# Patient Record
Sex: FEMALE | Race: WHITE | Employment: UNEMPLOYED | URBAN - METROPOLITAN AREA
[De-identification: names, ages, dates, MRNs, and addresses within clinical notes are randomized per-mention and may not be internally consistent; named-entity substitution may affect disease eponyms.]

---

## 2020-07-17 ENCOUNTER — OFFICE VISIT (OUTPATIENT)
Dept: ORTHOPEDIC SURGERY | Age: 60
End: 2020-07-17
Payer: COMMERCIAL

## 2020-07-17 ENCOUNTER — TELEPHONE (OUTPATIENT)
Dept: ORTHOPEDIC SURGERY | Age: 60
End: 2020-07-17

## 2020-07-17 VITALS — HEIGHT: 67 IN | BODY MASS INDEX: 25.58 KG/M2 | WEIGHT: 163 LBS | TEMPERATURE: 98.5 F

## 2020-07-17 PROCEDURE — 99203 OFFICE O/P NEW LOW 30 MIN: CPT | Performed by: ORTHOPAEDIC SURGERY

## 2020-07-17 NOTE — TELEPHONE ENCOUNTER
07/17/2020  RAEKSH   (QTY 1)   LEFT  KNEE. DENIED  NO COVERAGE - CASH BASED PROGRAM. PER LEADERSHIP.  NOT A COVERED BENEFIT FOR THIS SELF FUNDED BCBS OF [facility], PER NOTES FOR THIS GROUP.  AP

## 2020-07-17 NOTE — PROGRESS NOTES
Antalgic      Additional Comments:       Additional Examinations:         Right Lower Extremity: Examination of the right lower extremity does not show any tenderness, deformity or injury. Range of motion is unremarkable. There is no gross instability. There are no rashes, ulcerations or lesions. Strength and tone are normal.    Radiology:     X-rays obtained and reviewed in office:  Views 4 views left knee does demonstrate significant degenerative change primarily within the medial compartment with significant medial joint space loss and osteophyte formation. No evidence of fracture dislocation    Assessment : Left knee osteoarthritis    Impression:  Encounter Diagnoses   Name Primary?  Left knee pain, unspecified chronicity Yes    Primary osteoarthritis of left knee        Office Procedures:  Orders Placed This Encounter   Procedures    XR KNEE LEFT (MIN 4 VIEWS)     Standing Status:   Future     Number of Occurrences:   1     Standing Expiration Date:   7/17/2021       Treatment Plan: I discussed the diagnosis and treatment options with her today. She does have significant medial compartmental osteoarthritis. She did have a cortisone injection into this knee about a month ago they gave her a little bit of relief for couple days. I would recommend at this time getting her approved for a series of Visco injections. She is agreeable with this plan. In addition she does have naproxen that she got through the emergency department that she will continue to use. Also recommend ice and anti-inflammatories. Avoidance of impact activities. I will see her back in clinic once we do get approval for the Visco injection.   We also did briefly discuss total knee arthroplasty of which she will be a candidate for in the future

## 2020-07-20 ENCOUNTER — TELEPHONE (OUTPATIENT)
Dept: ORTHOPEDIC SURGERY | Age: 60
End: 2020-07-20

## 2020-07-20 NOTE — TELEPHONE ENCOUNTER
GERRI for patient, Durolane left knee was denied by insurance, it is not a covered benefit with her plan. She will call the office if she decides to use [facility]'s cash based program.  Boone Hutton Ear

## 2020-07-28 ENCOUNTER — TELEPHONE (OUTPATIENT)
Dept: ORTHOPEDIC SURGERY | Age: 60
End: 2020-07-28

## 2020-07-28 NOTE — TELEPHONE ENCOUNTER
Returned patient call, LM that patient could either have Visco injections under self pay policy or schedule a follow up appointment for her left knee with Dr. Carol Pedro to discuss the next step. Keiko Hong

## 2020-08-18 ENCOUNTER — OFFICE VISIT (OUTPATIENT)
Dept: ORTHOPEDIC SURGERY | Age: 60
End: 2020-08-18
Payer: COMMERCIAL

## 2020-08-18 VITALS — WEIGHT: 163 LBS | BODY MASS INDEX: 25.58 KG/M2 | HEIGHT: 67 IN | RESPIRATION RATE: 17 BRPM

## 2020-08-18 PROCEDURE — 99213 OFFICE O/P EST LOW 20 MIN: CPT | Performed by: ORTHOPAEDIC SURGERY

## 2020-08-18 NOTE — PROGRESS NOTES
Chief Complaint    Follow-up (left knee)      History of Present Illness:  Rolo Sapp is a 64 y.o. female. She is here today for follow-up for her left knee. She does continue to have significant pain within her left knee. Pain is been getting progressively worse. She does feel more limited with activity now because of the pain within the knee. We did try and get her approved for Visco injections but she was denied. Medical History:  Patient's medications, allergies, past medical, surgical, social and family histories were reviewed and updated as appropriate. Review of Systems:  Pertinent items are noted in HPI  Review of systems reviewed from Patient History Form dated on 8/18/20 and available in the patient's chart under the Media tab. Vital Signs:  Resp 17   Ht 5' 6.5\" (1.689 m)   Wt 163 lb (73.9 kg)   BMI 25.91 kg/m²     General Exam:   Constitutional: Patient is adequately groomed with no evidence of malnutrition  DTRs: Deep tendon reflexes are intact  Mental Status: The patient is oriented to time, place and person. The patient's mood and affect are appropriate. Knee Examination:    Inspection: There is some visible swelling note about the left knee. She does have an overall varus alignment     Palpation: She does have a lot of tenderness palpation primarily along the medial joint line. There is some mild lateral joint line tenderness. Mild palpable effusion     Range of Motion: Extension of the knee 0 degrees with knee flexion today to 125 degrees     Strength: She is able to do a straight leg raise     Special Tests: She does have some pseudo-valgus laxity. Negative posterior drawer. Negative Apley David     Skin: There are no rashes, ulcerations or lesions.     Gait: Antalgic       Additional Comments:       Additional Examinations:         Right Lower Extremity: Examination of the right lower extremity does not show any tenderness, deformity or injury.   Range of

## 2020-08-27 ENCOUNTER — TELEPHONE (OUTPATIENT)
Dept: ORTHOPEDIC SURGERY | Age: 60
End: 2020-08-27

## 2020-09-22 RX ORDER — LOSARTAN POTASSIUM 100 MG/1
TABLET ORAL
COMMUNITY
Start: 2020-07-23

## 2020-09-22 RX ORDER — FUROSEMIDE 20 MG/1
20 TABLET ORAL
COMMUNITY
Start: 2020-09-16

## 2020-09-22 NOTE — PROGRESS NOTES
Name_______________________________________Printed:____________________  Date and time of uwajmby_[id number]_______________________[place] Time:__0530 [facility] [place]   1. The instructions given regarding when and if a patient needs to stop oral intake prior to surgery varies. Follow the specific instructions you were given                  _X__Nothing to eat or to drink after Midnight the night before.                             ____Endoscopy patient follow your DRS instructions-generally you will be doing a part of the prep after Midnight                   ___X_Carbo loading or ERAS instructions will be given to select patients-if you have been given those instructions -please do the following                           The evening before your surgery after dinner before midnight drink 40 ounces of gatorade. If you are diabetic use sugar free. The morning of surgery drink 40 ounces of water. This needs to be finished 3 hours prior to your surgery start time. 2. Take the following pills with a small sip of water on the morning of surgery__LEVOTHYROXINE, OMEPRAZOLE, METOPROLOL, FELODIPINE _________________________________________________                  Do not take blood pressure medications ending in pril or sartan the brad prior to surgery or the morning of surgery_   3. Aspirin, Ibuprofen, Advil, Naproxen, Vitamin E and other Anti-inflammatory products and supplements should be stopped for  -7days before surgery or as directed by your physician. 4. Check with your Doctor regarding stopping Plavix, Coumadin,Eliquis, Lovenox,Effient,Pradaxa,Xarelto, Fragmin or other blood thinners and follow their instructions. 5. Do not smoke, and do not drink any alcoholic beverages 24 hours prior to surgery. This includes NA Beer. Refrain from the usage of any recreational drugs. 6. You may brush your teeth and gargle the morning of surgery. DO NOT SWALLOW WATER   7.  You MUST make arrangements for a responsible adult to stay on site while you are here and take you home after your surgery. You will not be allowed to leave alone or drive yourself home. It is strongly suggested someone stay with you the first 24 hrs. Your surgery will be cancelled if you do not have a ride home. 8. A parent/legal guardian must accompany a child scheduled for surgery and plan to stay at the hospital until the child is discharged. Please do not bring other children with you. 9. Please wear simple, loose fitting clothing to the hospital.  Teodoro Stearns not bring valuables (money, credit cards, checkbooks, etc.) Do not wear any makeup (including no eye makeup) or nail polish on your fingers or toes. 10. DO NOT wear any jewelry or piercings on day of surgery. All body piercing jewelry must be removed. 11. If you have ___dentures, they will be removed before going to the OR; we will provide you a container. If you wear ___contact lenses or ___glasses, they will be removed; please bring a case for them. 12. Please see your family doctor/pediatrician for a history & physical and/or concerning medications. Bring any test results/reports from your physician's office. PCP__________________Phone___________H&P Appt. Date________             13 If you  have a Living Will and Durable Power of  for Healthcare, please bring in a copy. 15. Notify your Surgeon if you develop any illness between now and surgery  time, cough, cold, fever, sore throat, nausea, vomiting, etc.  Please notify your surgeon if you experience dizziness, shortness of breath or blurred vision between now & the time of your surgery             15. DO NOT shave your operative site 96 hours prior to surgery. For face & neck surgery, men may use an electric razor 48 hours prior to surgery. 16. Shower the night before or morning of surgery using an antibacterial soap or as you have been instructed.              17. To provide

## 2020-09-23 RX ORDER — FELODIPINE 2.5 MG/1
2.5 TABLET, EXTENDED RELEASE ORAL DAILY
COMMUNITY

## 2020-09-24 ENCOUNTER — TELEPHONE (OUTPATIENT)
Dept: ORTHOPEDIC SURGERY | Age: 60
End: 2020-09-24

## 2020-09-25 ENCOUNTER — OFFICE VISIT (OUTPATIENT)
Dept: PRIMARY CARE CLINIC | Age: 60
End: 2020-09-25
Payer: COMMERCIAL

## 2020-09-25 ENCOUNTER — HOSPITAL ENCOUNTER (OUTPATIENT)
Age: 60
Discharge: HOME | End: 2020-09-25
Payer: COMMERCIAL

## 2020-09-25 LAB
ABO/RH: NORMAL
ANION GAP SERPL CALCULATED.3IONS-SCNC: 15 MMOL/L (ref 3–16)
ANTIBODY SCREEN: NORMAL
APTT: 28.1 SEC (ref 24.2–36.2)
BASOPHILS ABSOLUTE: 0.1 K/UL (ref 0–0.2)
BASOPHILS RELATIVE PERCENT: 0.8 %
BILIRUBIN URINE: NEGATIVE
BLOOD, URINE: NEGATIVE
BUN BLDV-MCNC: 5 MG/DL (ref 7–20)
CALCIUM SERPL-MCNC: 9.4 MG/DL (ref 8.3–10.6)
CHLORIDE BLD-SCNC: 92 MMOL/L (ref 99–110)
CLARITY: ABNORMAL
CO2: 24 MMOL/L (ref 21–32)
COLOR: YELLOW
CREAT SERPL-MCNC: <0.5 MG/DL (ref 0.6–1.1)
EOSINOPHILS ABSOLUTE: 0.1 K/UL (ref 0–0.6)
EOSINOPHILS RELATIVE PERCENT: 1.7 %
EPITHELIAL CELLS, UA: NORMAL /HPF (ref 0–5)
GFR AFRICAN AMERICAN: >60
GFR NON-AFRICAN AMERICAN: >60
GLUCOSE BLD-MCNC: 104 MG/DL (ref 70–99)
GLUCOSE URINE: NEGATIVE MG/DL
HCT VFR BLD CALC: 41.2 % (ref 36–48)
HEMOGLOBIN: 14.1 G/DL (ref 12–16)
INR BLD: 0.92 (ref 0.86–1.14)
KETONES, URINE: NEGATIVE MG/DL
LEUKOCYTE ESTERASE, URINE: ABNORMAL
LYMPHOCYTES ABSOLUTE: 1.8 K/UL (ref 1–5.1)
LYMPHOCYTES RELATIVE PERCENT: 21.2 %
MCH RBC QN AUTO: 34.9 PG (ref 26–34)
MCHC RBC AUTO-ENTMCNC: 34.1 G/DL (ref 31–36)
MCV RBC AUTO: 102.2 FL (ref 80–100)
MICROSCOPIC EXAMINATION: YES
MONOCYTES ABSOLUTE: 0.5 K/UL (ref 0–1.3)
MONOCYTES RELATIVE PERCENT: 6.3 %
NEUTROPHILS ABSOLUTE: 6.1 K/UL (ref 1.7–7.7)
NEUTROPHILS RELATIVE PERCENT: 70 %
NITRITE, URINE: NEGATIVE
PDW BLD-RTO: 12.6 % (ref 12.4–15.4)
PH UA: 7 (ref 5–8)
PLATELET # BLD: 312 K/UL (ref 135–450)
PMV BLD AUTO: 7.8 FL (ref 5–10.5)
POTASSIUM SERPL-SCNC: 4.9 MMOL/L (ref 3.5–5.1)
PROTEIN UA: NEGATIVE MG/DL
PROTHROMBIN TIME: 10.7 SEC (ref 10–13.2)
RBC # BLD: 4.04 M/UL (ref 4–5.2)
RBC UA: NORMAL /HPF (ref 0–4)
SODIUM BLD-SCNC: 131 MMOL/L (ref 136–145)
SPECIFIC GRAVITY UA: 1 (ref 1–1.03)
URINE TYPE: ABNORMAL
UROBILINOGEN, URINE: 0.2 E.U./DL
WBC # BLD: 8.7 K/UL (ref 4–11)
WBC UA: NORMAL /HPF (ref 0–5)

## 2020-09-25 PROCEDURE — 36415 COLL VENOUS BLD VENIPUNCTURE: CPT

## 2020-09-25 PROCEDURE — 99211 OFF/OP EST MAY X REQ PHY/QHP: CPT | Performed by: NURSE PRACTITIONER

## 2020-09-25 PROCEDURE — 86901 BLOOD TYPING SEROLOGIC RH(D): CPT

## 2020-09-25 PROCEDURE — 86850 RBC ANTIBODY SCREEN: CPT

## 2020-09-25 PROCEDURE — 85610 PROTHROMBIN TIME: CPT

## 2020-09-25 PROCEDURE — 87086 URINE CULTURE/COLONY COUNT: CPT

## 2020-09-25 PROCEDURE — 81001 URINALYSIS AUTO W/SCOPE: CPT

## 2020-09-25 PROCEDURE — 85025 COMPLETE CBC W/AUTO DIFF WBC: CPT

## 2020-09-25 PROCEDURE — 86900 BLOOD TYPING SEROLOGIC ABO: CPT

## 2020-09-25 PROCEDURE — 85730 THROMBOPLASTIN TIME PARTIAL: CPT

## 2020-09-25 PROCEDURE — 80048 BASIC METABOLIC PNL TOTAL CA: CPT

## 2020-09-25 PROCEDURE — 87081 CULTURE SCREEN ONLY: CPT

## 2020-09-25 NOTE — PATIENT INSTRUCTIONS
Advance Care Planning  People with COVID-19 may have no symptoms, mild symptoms, such as fever, cough, and shortness of breath or they may have more severe illness, developing severe and fatal pneumonia. As a result, Advance Care Planning with attention to naming a health care decision maker (someone you trust to make healthcare decisions for you if you could not speak for yourself) and sharing other health care preferences is important BEFORE a possible health crisis. Please contact your Primary Care Provider to discuss Advance Care Planning. Preventing the Spread of Coronavirus Disease 2019 in Homes and Residential Communities  For the most recent information go to [email or web address]    Prevention steps for People with confirmed or suspected COVID-19 (including persons under investigation) who do not need to be hospitalized  and   People with confirmed COVID-19 who were hospitalized and determined to be medically stable to go home    Your healthcare provider and public health staff will evaluate whether you can be cared for at home. If it is determined that you do not need to be hospitalized and can be isolated at home, you will be monitored by staff from your local or state health department. You should follow the prevention steps below until a healthcare provider or local or state health department says you can return to your normal activities. Stay home except to get medical care  People who are mildly ill with COVID-19 are able to isolate at home during their illness. You should restrict activities outside your home, except for getting medical care. Do not go to work, school, or public areas. Avoid using public transportation, ride-sharing, or taxis. Separate yourself from other people and animals in your home  People: As much as possible, you should stay in a specific room and away from other people in your home.  Also, you should use a separate bathroom, if available. Animals: You should restrict contact with pets and other animals while you are sick with COVID-19, just like you would around other people. Although there have not been reports of pets or other animals becoming sick with COVID-19, it is still recommended that people sick with COVID-19 limit contact with animals until more information is known about the virus. When possible, have another member of your household care for your animals while you are sick. If you are sick with COVID-19, avoid contact with your pet, including petting, snuggling, being kissed or licked, and sharing food. If you must care for your pet or be around animals while you are sick, wash your hands before and after you interact with pets and wear a facemask. Call ahead before visiting your doctor  If you have a medical appointment, call the healthcare provider and tell them that you have or may have COVID-19. This will help the healthcare providers office take steps to keep other people from getting infected or exposed. Wear a facemask  You should wear a facemask when you are around other people (e.g., sharing a room or vehicle) or pets and before you enter a healthcare providers office. If you are not able to wear a facemask (for example, because it causes trouble breathing), then people who live with you should not stay in the same room with you, or they should wear a facemask if they enter your room. Cover your coughs and sneezes  Cover your mouth and nose with a tissue when you cough or sneeze. Throw used tissues in a lined trash can. Immediately wash your hands with soap and water for at least 20 seconds or, if soap and water are not available, clean your hands with an alcohol-based hand  that contains at least 60% alcohol.   Clean your hands often  Wash your hands often with soap and water for at least 20 seconds, especially after blowing your nose, coughing, or sneezing; going to the bathroom; and have a medical emergency and need to call 911, notify the dispatch personnel that you have, or are being evaluated for COVID-19. If possible, put on a facemask before emergency medical services arrive. Discontinuing home isolation  Patients with confirmed COVID-19 should remain under home isolation precautions until the risk of secondary transmission to others is thought to be low. The decision to discontinue home isolation precautions should be made on a case-by-case basis, in consultation with healthcare providers and state and local health departments.

## 2020-09-25 NOTE — PROGRESS NOTES
Megan Monreal received a viral test for COVID-19. They were educated on isolation and quarantine as appropriate. For any symptoms, they were directed to seek care from their PCP, given contact information to establish with a doctor, directed to an urgent care or the emergency room.

## 2020-09-26 LAB
SARS-COV-2, NAA: NOT DETECTED
URINE CULTURE, ROUTINE: NORMAL

## 2020-09-27 LAB — MRSA CULTURE ONLY: NORMAL

## 2020-10-01 ENCOUNTER — APPOINTMENT (OUTPATIENT)
Dept: GENERAL RADIOLOGY | Age: 60
End: 2020-10-01
Attending: ORTHOPAEDIC SURGERY
Payer: COMMERCIAL

## 2020-10-01 ENCOUNTER — ANESTHESIA EVENT (OUTPATIENT)
Dept: OPERATING ROOM | Age: 60
End: 2020-10-01
Payer: COMMERCIAL

## 2020-10-01 ENCOUNTER — HOSPITAL ENCOUNTER (OUTPATIENT)
Age: 60
Setting detail: OBSERVATION
Discharge: HOME HEALTH | End: 2020-10-02
Attending: ORTHOPAEDIC SURGERY | Admitting: ORTHOPAEDIC SURGERY
Payer: COMMERCIAL

## 2020-10-01 ENCOUNTER — ANESTHESIA (OUTPATIENT)
Dept: OPERATING ROOM | Age: 60
End: 2020-10-01
Payer: COMMERCIAL

## 2020-10-01 VITALS
TEMPERATURE: 97.2 F | RESPIRATION RATE: 6 BRPM | DIASTOLIC BLOOD PRESSURE: 101 MMHG | OXYGEN SATURATION: 100 % | SYSTOLIC BLOOD PRESSURE: 173 MMHG

## 2020-10-01 PROBLEM — K21.9 GERD (GASTROESOPHAGEAL REFLUX DISEASE): Status: ACTIVE | Noted: 2020-10-01

## 2020-10-01 PROBLEM — I10 HTN (HYPERTENSION): Status: ACTIVE | Noted: 2020-10-01

## 2020-10-01 PROBLEM — M17.12 PRIMARY OSTEOARTHRITIS OF LEFT KNEE: Status: ACTIVE | Noted: 2020-10-01

## 2020-10-01 PROBLEM — E03.9 HYPOTHYROID: Status: ACTIVE | Noted: 2020-10-01

## 2020-10-01 LAB
ABO/RH: NORMAL
ANTIBODY SCREEN: NORMAL
GLUCOSE BLD-MCNC: 163 MG/DL (ref 70–99)
GLUCOSE BLD-MCNC: 169 MG/DL (ref 70–99)
GLUCOSE BLD-MCNC: 233 MG/DL (ref 70–99)
HCT VFR BLD CALC: 38.4 % (ref 36–48)
HEMOGLOBIN: 13.4 G/DL (ref 12–16)
PERFORMED ON: ABNORMAL

## 2020-10-01 PROCEDURE — 7100000001 HC PACU RECOVERY - ADDTL 15 MIN: Performed by: ORTHOPAEDIC SURGERY

## 2020-10-01 PROCEDURE — APPNB45 APP NON BILLABLE 31-45 MINUTES: Performed by: NURSE PRACTITIONER

## 2020-10-01 PROCEDURE — 94150 VITAL CAPACITY TEST: CPT

## 2020-10-01 PROCEDURE — C1776 JOINT DEVICE (IMPLANTABLE): HCPCS | Performed by: ORTHOPAEDIC SURGERY

## 2020-10-01 PROCEDURE — 94760 N-INVAS EAR/PLS OXIMETRY 1: CPT

## 2020-10-01 PROCEDURE — 7100000000 HC PACU RECOVERY - FIRST 15 MIN: Performed by: ORTHOPAEDIC SURGERY

## 2020-10-01 PROCEDURE — 6360000002 HC RX W HCPCS: Performed by: ORTHOPAEDIC SURGERY

## 2020-10-01 PROCEDURE — 3600000015 HC SURGERY LEVEL 5 ADDTL 15MIN: Performed by: ORTHOPAEDIC SURGERY

## 2020-10-01 PROCEDURE — 3600000005 HC SURGERY LEVEL 5 BASE: Performed by: ORTHOPAEDIC SURGERY

## 2020-10-01 PROCEDURE — 3700000000 HC ANESTHESIA ATTENDED CARE: Performed by: ORTHOPAEDIC SURGERY

## 2020-10-01 PROCEDURE — 64447 NJX AA&/STRD FEMORAL NRV IMG: CPT | Performed by: FAMILY MEDICINE

## 2020-10-01 PROCEDURE — 2709999900 HC NON-CHARGEABLE SUPPLY: Performed by: ORTHOPAEDIC SURGERY

## 2020-10-01 PROCEDURE — 2580000003 HC RX 258: Performed by: ORTHOPAEDIC SURGERY

## 2020-10-01 PROCEDURE — 97116 GAIT TRAINING THERAPY: CPT

## 2020-10-01 PROCEDURE — 2720000010 HC SURG SUPPLY STERILE: Performed by: ORTHOPAEDIC SURGERY

## 2020-10-01 PROCEDURE — 99024 POSTOP FOLLOW-UP VISIT: CPT | Performed by: NURSE PRACTITIONER

## 2020-10-01 PROCEDURE — 97165 OT EVAL LOW COMPLEX 30 MIN: CPT

## 2020-10-01 PROCEDURE — 73560 X-RAY EXAM OF KNEE 1 OR 2: CPT

## 2020-10-01 PROCEDURE — 97530 THERAPEUTIC ACTIVITIES: CPT

## 2020-10-01 PROCEDURE — 3700000001 HC ADD 15 MINUTES (ANESTHESIA): Performed by: ORTHOPAEDIC SURGERY

## 2020-10-01 PROCEDURE — G0378 HOSPITAL OBSERVATION PER HR: HCPCS

## 2020-10-01 PROCEDURE — 51798 US URINE CAPACITY MEASURE: CPT

## 2020-10-01 PROCEDURE — 64445 NJX AA&/STRD SCIATIC NRV IMG: CPT | Performed by: FAMILY MEDICINE

## 2020-10-01 PROCEDURE — C1713 ANCHOR/SCREW BN/BN,TIS/BN: HCPCS | Performed by: ORTHOPAEDIC SURGERY

## 2020-10-01 PROCEDURE — 2500000003 HC RX 250 WO HCPCS: Performed by: NURSE ANESTHETIST, CERTIFIED REGISTERED

## 2020-10-01 PROCEDURE — 2500000003 HC RX 250 WO HCPCS: Performed by: ORTHOPAEDIC SURGERY

## 2020-10-01 PROCEDURE — 1200000000 HC SEMI PRIVATE

## 2020-10-01 PROCEDURE — 85018 HEMOGLOBIN: CPT

## 2020-10-01 PROCEDURE — 2580000003 HC RX 258: Performed by: NURSE ANESTHETIST, CERTIFIED REGISTERED

## 2020-10-01 PROCEDURE — 97161 PT EVAL LOW COMPLEX 20 MIN: CPT

## 2020-10-01 PROCEDURE — 85014 HEMATOCRIT: CPT

## 2020-10-01 PROCEDURE — 86850 RBC ANTIBODY SCREEN: CPT

## 2020-10-01 PROCEDURE — 6360000002 HC RX W HCPCS: Performed by: NURSE ANESTHETIST, CERTIFIED REGISTERED

## 2020-10-01 PROCEDURE — 6370000000 HC RX 637 (ALT 250 FOR IP): Performed by: ORTHOPAEDIC SURGERY

## 2020-10-01 PROCEDURE — 86900 BLOOD TYPING SEROLOGIC ABO: CPT

## 2020-10-01 PROCEDURE — 86901 BLOOD TYPING SEROLOGIC RH(D): CPT

## 2020-10-01 DEVICE — JOURNEY TIBIAL BASEPLATE NONPOROUS                                    LEFT SIZE 4
Type: IMPLANTABLE DEVICE | Site: KNEE | Status: FUNCTIONAL
Brand: JOURNEY

## 2020-10-01 DEVICE — JOURNEY II BCS FEMORAL OXINIUM                                    LEFT SIZE 5
Type: IMPLANTABLE DEVICE | Site: KNEE | Status: FUNCTIONAL
Brand: JOURNEY

## 2020-10-01 DEVICE — JOURNEY II BCS XLPE ARTICULAR                                    INSERT SIZE 3-4 LEFT 12MM
Type: IMPLANTABLE DEVICE | Site: KNEE | Status: FUNCTIONAL
Brand: JOURNEY

## 2020-10-01 DEVICE — CEMENT BNE 40GM FULL DOSE PMMA W/ GENT HI VISC RADPQ LNG: Type: IMPLANTABLE DEVICE | Site: KNEE | Status: FUNCTIONAL

## 2020-10-01 DEVICE — GENESIS II OVAL RESURFACING                                    PATELLAR 32MM
Type: IMPLANTABLE DEVICE | Site: PATELLA | Status: FUNCTIONAL
Brand: GENESIS II

## 2020-10-01 RX ORDER — HYDRALAZINE HYDROCHLORIDE 20 MG/ML
10 INJECTION INTRAMUSCULAR; INTRAVENOUS EVERY 6 HOURS PRN
Status: DISCONTINUED | OUTPATIENT
Start: 2020-10-01 | End: 2020-10-01 | Stop reason: RX

## 2020-10-01 RX ORDER — ASCORBIC ACID 500 MG
500 TABLET ORAL DAILY
Status: DISCONTINUED | OUTPATIENT
Start: 2020-10-01 | End: 2020-10-02 | Stop reason: HOSPADM

## 2020-10-01 RX ORDER — LOSARTAN POTASSIUM 100 MG/1
100 TABLET ORAL DAILY
Status: DISCONTINUED | OUTPATIENT
Start: 2020-10-01 | End: 2020-10-01

## 2020-10-01 RX ORDER — SODIUM CHLORIDE 0.9 % (FLUSH) 0.9 %
10 SYRINGE (ML) INJECTION PRN
Status: DISCONTINUED | OUTPATIENT
Start: 2020-10-01 | End: 2020-10-02 | Stop reason: HOSPADM

## 2020-10-01 RX ORDER — NEOSTIGMINE METHYLSULFATE 5 MG/5 ML
SYRINGE (ML) INTRAVENOUS PRN
Status: DISCONTINUED | OUTPATIENT
Start: 2020-10-01 | End: 2020-10-01 | Stop reason: SDUPTHER

## 2020-10-01 RX ORDER — HYDROMORPHONE HCL 110MG/55ML
0.25 PATIENT CONTROLLED ANALGESIA SYRINGE INTRAVENOUS EVERY 5 MIN PRN
Status: DISCONTINUED | OUTPATIENT
Start: 2020-10-01 | End: 2020-10-01

## 2020-10-01 RX ORDER — SODIUM CHLORIDE, SODIUM LACTATE, POTASSIUM CHLORIDE, CALCIUM CHLORIDE 600; 310; 30; 20 MG/100ML; MG/100ML; MG/100ML; MG/100ML
INJECTION, SOLUTION INTRAVENOUS CONTINUOUS
Status: DISCONTINUED | OUTPATIENT
Start: 2020-10-01 | End: 2020-10-02 | Stop reason: HOSPADM

## 2020-10-01 RX ORDER — HYDROMORPHONE HYDROCHLORIDE 1 MG/ML
0.5 INJECTION, SOLUTION INTRAMUSCULAR; INTRAVENOUS; SUBCUTANEOUS
Status: DISCONTINUED | OUTPATIENT
Start: 2020-10-01 | End: 2020-10-02 | Stop reason: HOSPADM

## 2020-10-01 RX ORDER — PROMETHAZINE HYDROCHLORIDE 25 MG/ML
6.25 INJECTION, SOLUTION INTRAMUSCULAR; INTRAVENOUS PRN
Status: DISCONTINUED | OUTPATIENT
Start: 2020-10-01 | End: 2020-10-01

## 2020-10-01 RX ORDER — DEXTROSE MONOHYDRATE 25 G/50ML
12.5 INJECTION, SOLUTION INTRAVENOUS PRN
Status: DISCONTINUED | OUTPATIENT
Start: 2020-10-01 | End: 2020-10-02 | Stop reason: HOSPADM

## 2020-10-01 RX ORDER — MEPERIDINE HYDROCHLORIDE 25 MG/ML
12.5 INJECTION INTRAMUSCULAR; INTRAVENOUS; SUBCUTANEOUS EVERY 5 MIN PRN
Status: DISCONTINUED | OUTPATIENT
Start: 2020-10-01 | End: 2020-10-01

## 2020-10-01 RX ORDER — MIDAZOLAM HYDROCHLORIDE 1 MG/ML
INJECTION INTRAMUSCULAR; INTRAVENOUS PRN
Status: DISCONTINUED | OUTPATIENT
Start: 2020-10-01 | End: 2020-10-01 | Stop reason: SDUPTHER

## 2020-10-01 RX ORDER — SENNA AND DOCUSATE SODIUM 50; 8.6 MG/1; MG/1
1 TABLET, FILM COATED ORAL 2 TIMES DAILY
Status: DISCONTINUED | OUTPATIENT
Start: 2020-10-01 | End: 2020-10-02 | Stop reason: HOSPADM

## 2020-10-01 RX ORDER — HYDROMORPHONE HCL 110MG/55ML
PATIENT CONTROLLED ANALGESIA SYRINGE INTRAVENOUS PRN
Status: DISCONTINUED | OUTPATIENT
Start: 2020-10-01 | End: 2020-10-01 | Stop reason: SDUPTHER

## 2020-10-01 RX ORDER — GLYCOPYRROLATE 0.2 MG/ML
INJECTION INTRAMUSCULAR; INTRAVENOUS PRN
Status: DISCONTINUED | OUTPATIENT
Start: 2020-10-01 | End: 2020-10-01 | Stop reason: SDUPTHER

## 2020-10-01 RX ORDER — LOSARTAN POTASSIUM 100 MG/1
100 TABLET ORAL DAILY
Status: DISCONTINUED | OUTPATIENT
Start: 2020-10-01 | End: 2020-10-02 | Stop reason: HOSPADM

## 2020-10-01 RX ORDER — FENTANYL CITRATE 50 UG/ML
INJECTION, SOLUTION INTRAMUSCULAR; INTRAVENOUS PRN
Status: DISCONTINUED | OUTPATIENT
Start: 2020-10-01 | End: 2020-10-01 | Stop reason: SDUPTHER

## 2020-10-01 RX ORDER — INSULIN LISPRO 100 [IU]/ML
0-12 INJECTION, SOLUTION INTRAVENOUS; SUBCUTANEOUS
Status: DISCONTINUED | OUTPATIENT
Start: 2020-10-01 | End: 2020-10-02 | Stop reason: HOSPADM

## 2020-10-01 RX ORDER — HYDROMORPHONE HCL 110MG/55ML
0.5 PATIENT CONTROLLED ANALGESIA SYRINGE INTRAVENOUS EVERY 5 MIN PRN
Status: DISCONTINUED | OUTPATIENT
Start: 2020-10-01 | End: 2020-10-01

## 2020-10-01 RX ORDER — SODIUM CHLORIDE, SODIUM LACTATE, POTASSIUM CHLORIDE, CALCIUM CHLORIDE 600; 310; 30; 20 MG/100ML; MG/100ML; MG/100ML; MG/100ML
INJECTION, SOLUTION INTRAVENOUS CONTINUOUS PRN
Status: DISCONTINUED | OUTPATIENT
Start: 2020-10-01 | End: 2020-10-01 | Stop reason: SDUPTHER

## 2020-10-01 RX ORDER — INSULIN LISPRO 100 [IU]/ML
0-6 INJECTION, SOLUTION INTRAVENOUS; SUBCUTANEOUS NIGHTLY
Status: DISCONTINUED | OUTPATIENT
Start: 2020-10-01 | End: 2020-10-02 | Stop reason: HOSPADM

## 2020-10-01 RX ORDER — NICOTINE POLACRILEX 4 MG
15 LOZENGE BUCCAL PRN
Status: DISCONTINUED | OUTPATIENT
Start: 2020-10-01 | End: 2020-10-02 | Stop reason: HOSPADM

## 2020-10-01 RX ORDER — DEXAMETHASONE SODIUM PHOSPHATE 4 MG/ML
10 INJECTION, SOLUTION INTRA-ARTICULAR; INTRALESIONAL; INTRAMUSCULAR; INTRAVENOUS; SOFT TISSUE ONCE
Status: COMPLETED | OUTPATIENT
Start: 2020-10-02 | End: 2020-10-02

## 2020-10-01 RX ORDER — ONDANSETRON 2 MG/ML
INJECTION INTRAMUSCULAR; INTRAVENOUS PRN
Status: DISCONTINUED | OUTPATIENT
Start: 2020-10-01 | End: 2020-10-01 | Stop reason: SDUPTHER

## 2020-10-01 RX ORDER — FUROSEMIDE 20 MG/1
20 TABLET ORAL
Status: DISCONTINUED | OUTPATIENT
Start: 2020-10-01 | End: 2020-10-02 | Stop reason: HOSPADM

## 2020-10-01 RX ORDER — HYDROMORPHONE HYDROCHLORIDE 1 MG/ML
0.25 INJECTION, SOLUTION INTRAMUSCULAR; INTRAVENOUS; SUBCUTANEOUS
Status: DISCONTINUED | OUTPATIENT
Start: 2020-10-01 | End: 2020-10-02 | Stop reason: HOSPADM

## 2020-10-01 RX ORDER — OXYCODONE HYDROCHLORIDE 5 MG/1
10 TABLET ORAL PRN
Status: DISCONTINUED | OUTPATIENT
Start: 2020-10-01 | End: 2020-10-01

## 2020-10-01 RX ORDER — POTASSIUM CHLORIDE 7.45 MG/ML
10 INJECTION INTRAVENOUS PRN
Status: DISCONTINUED | OUTPATIENT
Start: 2020-10-01 | End: 2020-10-02 | Stop reason: HOSPADM

## 2020-10-01 RX ORDER — ACETAMINOPHEN 325 MG/1
650 TABLET ORAL EVERY 6 HOURS
Status: DISCONTINUED | OUTPATIENT
Start: 2020-10-01 | End: 2020-10-02 | Stop reason: HOSPADM

## 2020-10-01 RX ORDER — ROCURONIUM BROMIDE 10 MG/ML
INJECTION, SOLUTION INTRAVENOUS PRN
Status: DISCONTINUED | OUTPATIENT
Start: 2020-10-01 | End: 2020-10-01 | Stop reason: SDUPTHER

## 2020-10-01 RX ORDER — DIPHENHYDRAMINE HYDROCHLORIDE 50 MG/ML
12.5 INJECTION INTRAMUSCULAR; INTRAVENOUS
Status: DISCONTINUED | OUTPATIENT
Start: 2020-10-01 | End: 2020-10-01

## 2020-10-01 RX ORDER — SUCCINYLCHOLINE/SOD CL,ISO/PF 200MG/10ML
SYRINGE (ML) INTRAVENOUS PRN
Status: DISCONTINUED | OUTPATIENT
Start: 2020-10-01 | End: 2020-10-01 | Stop reason: SDUPTHER

## 2020-10-01 RX ORDER — SODIUM CHLORIDE, SODIUM LACTATE, POTASSIUM CHLORIDE, CALCIUM CHLORIDE 600; 310; 30; 20 MG/100ML; MG/100ML; MG/100ML; MG/100ML
INJECTION, SOLUTION INTRAVENOUS CONTINUOUS
Status: DISCONTINUED | OUTPATIENT
Start: 2020-10-01 | End: 2020-10-01

## 2020-10-01 RX ORDER — LABETALOL HYDROCHLORIDE 5 MG/ML
5 INJECTION, SOLUTION INTRAVENOUS EVERY 10 MIN PRN
Status: DISCONTINUED | OUTPATIENT
Start: 2020-10-01 | End: 2020-10-01

## 2020-10-01 RX ORDER — FENTANYL CITRATE 50 UG/ML
50 INJECTION, SOLUTION INTRAMUSCULAR; INTRAVENOUS EVERY 5 MIN PRN
Status: DISCONTINUED | OUTPATIENT
Start: 2020-10-01 | End: 2020-10-01

## 2020-10-01 RX ORDER — PROPOFOL 10 MG/ML
INJECTION, EMULSION INTRAVENOUS PRN
Status: DISCONTINUED | OUTPATIENT
Start: 2020-10-01 | End: 2020-10-01 | Stop reason: SDUPTHER

## 2020-10-01 RX ORDER — AMLODIPINE BESYLATE 5 MG/1
2.5 TABLET ORAL DAILY
Status: DISCONTINUED | OUTPATIENT
Start: 2020-10-02 | End: 2020-10-02 | Stop reason: HOSPADM

## 2020-10-01 RX ORDER — SODIUM CHLORIDE 0.9 % (FLUSH) 0.9 %
10 SYRINGE (ML) INJECTION EVERY 12 HOURS SCHEDULED
Status: DISCONTINUED | OUTPATIENT
Start: 2020-10-01 | End: 2020-10-02 | Stop reason: HOSPADM

## 2020-10-01 RX ORDER — POTASSIUM CHLORIDE 20 MEQ/1
40 TABLET, EXTENDED RELEASE ORAL PRN
Status: DISCONTINUED | OUTPATIENT
Start: 2020-10-01 | End: 2020-10-02 | Stop reason: HOSPADM

## 2020-10-01 RX ORDER — ONDANSETRON 2 MG/ML
4 INJECTION INTRAMUSCULAR; INTRAVENOUS EVERY 6 HOURS PRN
Status: DISCONTINUED | OUTPATIENT
Start: 2020-10-01 | End: 2020-10-02 | Stop reason: HOSPADM

## 2020-10-01 RX ORDER — LIDOCAINE HYDROCHLORIDE 10 MG/ML
0.5 INJECTION, SOLUTION EPIDURAL; INFILTRATION; INTRACAUDAL; PERINEURAL ONCE
Status: DISCONTINUED | OUTPATIENT
Start: 2020-10-01 | End: 2020-10-01

## 2020-10-01 RX ORDER — LIDOCAINE HYDROCHLORIDE 20 MG/ML
INJECTION, SOLUTION EPIDURAL; INFILTRATION; INTRACAUDAL; PERINEURAL PRN
Status: DISCONTINUED | OUTPATIENT
Start: 2020-10-01 | End: 2020-10-01 | Stop reason: SDUPTHER

## 2020-10-01 RX ORDER — DEXTROSE MONOHYDRATE 50 MG/ML
100 INJECTION, SOLUTION INTRAVENOUS PRN
Status: DISCONTINUED | OUTPATIENT
Start: 2020-10-01 | End: 2020-10-02 | Stop reason: HOSPADM

## 2020-10-01 RX ORDER — METOPROLOL SUCCINATE 50 MG/1
100 TABLET, EXTENDED RELEASE ORAL 2 TIMES DAILY
Status: DISCONTINUED | OUTPATIENT
Start: 2020-10-01 | End: 2020-10-02 | Stop reason: HOSPADM

## 2020-10-01 RX ORDER — OXYCODONE HYDROCHLORIDE 5 MG/1
5 TABLET ORAL PRN
Status: DISCONTINUED | OUTPATIENT
Start: 2020-10-01 | End: 2020-10-01

## 2020-10-01 RX ORDER — LABETALOL HYDROCHLORIDE 5 MG/ML
INJECTION, SOLUTION INTRAVENOUS PRN
Status: DISCONTINUED | OUTPATIENT
Start: 2020-10-01 | End: 2020-10-01 | Stop reason: SDUPTHER

## 2020-10-01 RX ORDER — CELECOXIB 200 MG/1
200 CAPSULE ORAL 2 TIMES DAILY
Status: DISCONTINUED | OUTPATIENT
Start: 2020-10-01 | End: 2020-10-02 | Stop reason: HOSPADM

## 2020-10-01 RX ORDER — HYDROCODONE BITARTRATE AND ACETAMINOPHEN 7.5; 325 MG/1; MG/1
1 TABLET ORAL EVERY 4 HOURS PRN
Status: DISCONTINUED | OUTPATIENT
Start: 2020-10-01 | End: 2020-10-02 | Stop reason: HOSPADM

## 2020-10-01 RX ORDER — LEVOTHYROXINE SODIUM 88 UG/1
88 TABLET ORAL DAILY
Status: DISCONTINUED | OUTPATIENT
Start: 2020-10-02 | End: 2020-10-02 | Stop reason: HOSPADM

## 2020-10-01 RX ORDER — CLOMIPRAMINE HYDROCHLORIDE 75 MG/1
75 CAPSULE ORAL NIGHTLY
Status: DISCONTINUED | OUTPATIENT
Start: 2020-10-01 | End: 2020-10-02 | Stop reason: HOSPADM

## 2020-10-01 RX ORDER — PANTOPRAZOLE SODIUM 40 MG/1
40 TABLET, DELAYED RELEASE ORAL
Status: DISCONTINUED | OUTPATIENT
Start: 2020-10-02 | End: 2020-10-02 | Stop reason: HOSPADM

## 2020-10-01 RX ORDER — PROMETHAZINE HYDROCHLORIDE 25 MG/1
12.5 TABLET ORAL EVERY 6 HOURS PRN
Status: DISCONTINUED | OUTPATIENT
Start: 2020-10-01 | End: 2020-10-02 | Stop reason: HOSPADM

## 2020-10-01 RX ORDER — 0.9 % SODIUM CHLORIDE 0.9 %
500 INTRAVENOUS SOLUTION INTRAVENOUS PRN
Status: DISCONTINUED | OUTPATIENT
Start: 2020-10-01 | End: 2020-10-02 | Stop reason: HOSPADM

## 2020-10-01 RX ORDER — DEXAMETHASONE SODIUM PHOSPHATE 4 MG/ML
INJECTION, SOLUTION INTRA-ARTICULAR; INTRALESIONAL; INTRAMUSCULAR; INTRAVENOUS; SOFT TISSUE PRN
Status: DISCONTINUED | OUTPATIENT
Start: 2020-10-01 | End: 2020-10-01 | Stop reason: SDUPTHER

## 2020-10-01 RX ADMIN — VANCOMYCIN HYDROCHLORIDE 1500 MG: 1 INJECTION, POWDER, LYOPHILIZED, FOR SOLUTION INTRAVENOUS at 06:21

## 2020-10-01 RX ADMIN — BUPIVACAINE HYDROCHLORIDE AND EPINEPHRINE BITARTRATE: 2.5; .005 INJECTION, SOLUTION EPIDURAL; INFILTRATION; INTRACAUDAL; PERINEURAL at 08:34

## 2020-10-01 RX ADMIN — BISACODYL 10 MG: 5 TABLET, COATED ORAL at 12:55

## 2020-10-01 RX ADMIN — SODIUM CHLORIDE, POTASSIUM CHLORIDE, SODIUM LACTATE AND CALCIUM CHLORIDE: 600; 310; 30; 20 INJECTION, SOLUTION INTRAVENOUS at 07:00

## 2020-10-01 RX ADMIN — HYDROCODONE BITARTRATE AND ACETAMINOPHEN 1 TABLET: 7.5; 325 TABLET ORAL at 20:33

## 2020-10-01 RX ADMIN — STANDARDIZED SENNA CONCENTRATE AND DOCUSATE SODIUM 1 TABLET: 8.6; 5 TABLET ORAL at 12:55

## 2020-10-01 RX ADMIN — MIDAZOLAM 2 MG: 1 INJECTION INTRAMUSCULAR; INTRAVENOUS at 07:00

## 2020-10-01 RX ADMIN — CEFAZOLIN SODIUM 2 G: 10 INJECTION, POWDER, FOR SOLUTION INTRAVENOUS at 06:56

## 2020-10-01 RX ADMIN — ASPIRIN 325 MG: 325 TABLET, COATED ORAL at 20:33

## 2020-10-01 RX ADMIN — CELECOXIB 200 MG: 200 CAPSULE ORAL at 20:33

## 2020-10-01 RX ADMIN — INSULIN LISPRO 4 UNITS: 100 INJECTION, SOLUTION INTRAVENOUS; SUBCUTANEOUS at 16:38

## 2020-10-01 RX ADMIN — LIDOCAINE HYDROCHLORIDE 100 MG: 20 INJECTION, SOLUTION EPIDURAL; INFILTRATION; INTRACAUDAL; PERINEURAL at 07:12

## 2020-10-01 RX ADMIN — INSULIN LISPRO 2 UNITS: 100 INJECTION, SOLUTION INTRAVENOUS; SUBCUTANEOUS at 11:03

## 2020-10-01 RX ADMIN — CLOMIPRAMINE HYDROCHLORIDE 75 MG: 75 CAPSULE ORAL at 20:33

## 2020-10-01 RX ADMIN — TRANEXAMIC ACID 1000 MG: 1 INJECTION, SOLUTION INTRAVENOUS at 08:58

## 2020-10-01 RX ADMIN — HYDROCODONE BITARTRATE AND ACETAMINOPHEN 1 TABLET: 7.5; 325 TABLET ORAL at 16:38

## 2020-10-01 RX ADMIN — ROCURONIUM BROMIDE 20 MG: 10 INJECTION, SOLUTION INTRAVENOUS at 07:31

## 2020-10-01 RX ADMIN — SODIUM CHLORIDE, POTASSIUM CHLORIDE, SODIUM LACTATE AND CALCIUM CHLORIDE: 600; 310; 30; 20 INJECTION, SOLUTION INTRAVENOUS at 06:35

## 2020-10-01 RX ADMIN — METOPROLOL SUCCINATE 100 MG: 50 TABLET, EXTENDED RELEASE ORAL at 20:33

## 2020-10-01 RX ADMIN — SODIUM CHLORIDE, POTASSIUM CHLORIDE, SODIUM LACTATE AND CALCIUM CHLORIDE: 600; 310; 30; 20 INJECTION, SOLUTION INTRAVENOUS at 20:32

## 2020-10-01 RX ADMIN — HYDROCODONE BITARTRATE AND ACETAMINOPHEN 1 TABLET: 7.5; 325 TABLET ORAL at 12:54

## 2020-10-01 RX ADMIN — CELECOXIB 200 MG: 200 CAPSULE ORAL at 12:55

## 2020-10-01 RX ADMIN — OXYCODONE HYDROCHLORIDE AND ACETAMINOPHEN 500 MG: 500 TABLET ORAL at 12:55

## 2020-10-01 RX ADMIN — Medication 140 MG: at 07:13

## 2020-10-01 RX ADMIN — FENTANYL CITRATE 50 MCG: 50 INJECTION, SOLUTION INTRAMUSCULAR; INTRAVENOUS at 07:10

## 2020-10-01 RX ADMIN — SODIUM CHLORIDE, POTASSIUM CHLORIDE, SODIUM LACTATE AND CALCIUM CHLORIDE: 600; 310; 30; 20 INJECTION, SOLUTION INTRAVENOUS at 10:54

## 2020-10-01 RX ADMIN — STANDARDIZED SENNA CONCENTRATE AND DOCUSATE SODIUM 1 TABLET: 8.6; 5 TABLET ORAL at 20:33

## 2020-10-01 RX ADMIN — INSULIN LISPRO 1 UNITS: 100 INJECTION, SOLUTION INTRAVENOUS; SUBCUTANEOUS at 20:35

## 2020-10-01 RX ADMIN — TRANEXAMIC ACID 1000 MG: 1 INJECTION, SOLUTION INTRAVENOUS at 06:52

## 2020-10-01 RX ADMIN — FENTANYL CITRATE 50 MCG: 50 INJECTION, SOLUTION INTRAMUSCULAR; INTRAVENOUS at 07:39

## 2020-10-01 RX ADMIN — SODIUM CHLORIDE, POTASSIUM CHLORIDE, SODIUM LACTATE AND CALCIUM CHLORIDE: 600; 310; 30; 20 INJECTION, SOLUTION INTRAVENOUS at 09:31

## 2020-10-01 RX ADMIN — ONDANSETRON 4 MG: 2 INJECTION INTRAMUSCULAR; INTRAVENOUS at 07:46

## 2020-10-01 RX ADMIN — Medication 4 MG: at 09:12

## 2020-10-01 RX ADMIN — SODIUM CHLORIDE, POTASSIUM CHLORIDE, SODIUM LACTATE AND CALCIUM CHLORIDE: 600; 310; 30; 20 INJECTION, SOLUTION INTRAVENOUS at 12:57

## 2020-10-01 RX ADMIN — FUROSEMIDE 20 MG: 20 TABLET ORAL at 12:56

## 2020-10-01 RX ADMIN — LABETALOL HYDROCHLORIDE 5 MG: 5 INJECTION, SOLUTION INTRAVENOUS at 08:13

## 2020-10-01 RX ADMIN — DEXAMETHASONE SODIUM PHOSPHATE 12 MG: 4 INJECTION, SOLUTION INTRAMUSCULAR; INTRAVENOUS at 07:46

## 2020-10-01 RX ADMIN — CEFAZOLIN SODIUM 2 G: 10 INJECTION, POWDER, FOR SOLUTION INTRAVENOUS at 16:38

## 2020-10-01 RX ADMIN — ACETAMINOPHEN 650 MG: 325 TABLET ORAL at 16:38

## 2020-10-01 RX ADMIN — ACETAMINOPHEN 650 MG: 325 TABLET ORAL at 12:55

## 2020-10-01 RX ADMIN — GLYCOPYRROLATE 0.6 MG: 0.2 INJECTION, SOLUTION INTRAMUSCULAR; INTRAVENOUS at 09:12

## 2020-10-01 RX ADMIN — LOSARTAN POTASSIUM 100 MG: 100 TABLET, FILM COATED ORAL at 20:59

## 2020-10-01 RX ADMIN — HYDROMORPHONE HYDROCHLORIDE 0.5 MG: 2 INJECTION, SOLUTION INTRAMUSCULAR; INTRAVENOUS; SUBCUTANEOUS at 08:33

## 2020-10-01 RX ADMIN — PROPOFOL 200 MG: 10 INJECTION, EMULSION INTRAVENOUS at 07:13

## 2020-10-01 ASSESSMENT — PULMONARY FUNCTION TESTS
PIF_VALUE: 23
PIF_VALUE: 21
PIF_VALUE: 31
PIF_VALUE: 25
PIF_VALUE: 26
PIF_VALUE: 10
PIF_VALUE: 4
PIF_VALUE: 26
PIF_VALUE: 25
PIF_VALUE: 26
PIF_VALUE: 26
PIF_VALUE: 21
PIF_VALUE: 25
PIF_VALUE: 22
PIF_VALUE: 27
PIF_VALUE: 26
PIF_VALUE: 25
PIF_VALUE: 26
PIF_VALUE: 19
PIF_VALUE: 27
PIF_VALUE: 25
PIF_VALUE: 25
PIF_VALUE: 26
PIF_VALUE: 26
PIF_VALUE: 24
PIF_VALUE: 25
PIF_VALUE: 0
PIF_VALUE: 25
PIF_VALUE: 29
PIF_VALUE: 2
PIF_VALUE: 30
PIF_VALUE: 26
PIF_VALUE: 11
PIF_VALUE: 3
PIF_VALUE: 25
PIF_VALUE: 26
PIF_VALUE: 11
PIF_VALUE: 22
PIF_VALUE: 26
PIF_VALUE: 27
PIF_VALUE: 25
PIF_VALUE: 24
PIF_VALUE: 25
PIF_VALUE: 25
PIF_VALUE: 11
PIF_VALUE: 20
PIF_VALUE: 24
PIF_VALUE: 19
PIF_VALUE: 0
PIF_VALUE: 26
PIF_VALUE: 34
PIF_VALUE: 21
PIF_VALUE: 24
PIF_VALUE: 21
PIF_VALUE: 11
PIF_VALUE: 26
PIF_VALUE: 25
PIF_VALUE: 26
PIF_VALUE: 25
PIF_VALUE: 14
PIF_VALUE: 25
PIF_VALUE: 25
PIF_VALUE: 26
PIF_VALUE: 25
PIF_VALUE: 0
PIF_VALUE: 22
PIF_VALUE: 22
PIF_VALUE: 25
PIF_VALUE: 5
PIF_VALUE: 24
PIF_VALUE: 23
PIF_VALUE: 27
PIF_VALUE: 25
PIF_VALUE: 26
PIF_VALUE: 25
PIF_VALUE: 22
PIF_VALUE: 25
PIF_VALUE: 24
PIF_VALUE: 25
PIF_VALUE: 20
PIF_VALUE: 25
PIF_VALUE: 24
PIF_VALUE: 18
PIF_VALUE: 11
PIF_VALUE: 20
PIF_VALUE: 25
PIF_VALUE: 25
PIF_VALUE: 5
PIF_VALUE: 1
PIF_VALUE: 23
PIF_VALUE: 26
PIF_VALUE: 26
PIF_VALUE: 22
PIF_VALUE: 26
PIF_VALUE: 22
PIF_VALUE: 29
PIF_VALUE: 24
PIF_VALUE: 32
PIF_VALUE: 26
PIF_VALUE: 26
PIF_VALUE: 27
PIF_VALUE: 24
PIF_VALUE: 25
PIF_VALUE: 20
PIF_VALUE: 24
PIF_VALUE: 22
PIF_VALUE: 26
PIF_VALUE: 7
PIF_VALUE: 26
PIF_VALUE: 25
PIF_VALUE: 24
PIF_VALUE: 25
PIF_VALUE: 11
PIF_VALUE: 25
PIF_VALUE: 25
PIF_VALUE: 29
PIF_VALUE: 29
PIF_VALUE: 21
PIF_VALUE: 21
PIF_VALUE: 25
PIF_VALUE: 29
PIF_VALUE: 0
PIF_VALUE: 18
PIF_VALUE: 26
PIF_VALUE: 11
PIF_VALUE: 18
PIF_VALUE: 24
PIF_VALUE: 25
PIF_VALUE: 15
PIF_VALUE: 13
PIF_VALUE: 27
PIF_VALUE: 22
PIF_VALUE: 24
PIF_VALUE: 22
PIF_VALUE: 28
PIF_VALUE: 4
PIF_VALUE: 26
PIF_VALUE: 21
PIF_VALUE: 25
PIF_VALUE: 25
PIF_VALUE: 21
PIF_VALUE: 26
PIF_VALUE: 24
PIF_VALUE: 25
PIF_VALUE: 25
PIF_VALUE: 11
PIF_VALUE: 26
PIF_VALUE: 26

## 2020-10-01 ASSESSMENT — PAIN DESCRIPTION - FREQUENCY
FREQUENCY: CONTINUOUS

## 2020-10-01 ASSESSMENT — PAIN SCALES - GENERAL
PAINLEVEL_OUTOF10: 2
PAINLEVEL_OUTOF10: 3
PAINLEVEL_OUTOF10: 4
PAINLEVEL_OUTOF10: 3
PAINLEVEL_OUTOF10: 4

## 2020-10-01 ASSESSMENT — PAIN DESCRIPTION - DESCRIPTORS
DESCRIPTORS: DISCOMFORT
DESCRIPTORS: ACHING;DISCOMFORT
DESCRIPTORS: ACHING;DISCOMFORT
DESCRIPTORS: ACHING
DESCRIPTORS: ACHING;DISCOMFORT
DESCRIPTORS: DISCOMFORT

## 2020-10-01 ASSESSMENT — ENCOUNTER SYMPTOMS
SINUS PRESSURE: 0
NAUSEA: 0
VOMITING: 0
COUGH: 0
EYE PAIN: 0
SINUS PAIN: 0
EYE REDNESS: 0
SHORTNESS OF BREATH: 0

## 2020-10-01 ASSESSMENT — PAIN DESCRIPTION - ONSET
ONSET: ON-GOING

## 2020-10-01 ASSESSMENT — PAIN DESCRIPTION - LOCATION
LOCATION: KNEE

## 2020-10-01 ASSESSMENT — PAIN DESCRIPTION - PAIN TYPE
TYPE: SURGICAL PAIN
TYPE: SURGICAL PAIN;ACUTE PAIN
TYPE: SURGICAL PAIN

## 2020-10-01 ASSESSMENT — PAIN - FUNCTIONAL ASSESSMENT: PAIN_FUNCTIONAL_ASSESSMENT: 0-10

## 2020-10-01 ASSESSMENT — PAIN DESCRIPTION - ORIENTATION
ORIENTATION: LEFT

## 2020-10-01 NOTE — PROGRESS NOTES
Occupational Therapy   Occupational Therapy Initial Assessment  Date: 10/1/2020   Patient Name: Mechelle Whyte  MRN: [id number]     : 1960    Date of Service: 10/1/2020    Discharge Recommendations:  Mechelle Whyte scored a 18/24 on the AM-PAC ADL Inpatient form. At this time, no further OT is recommended upon discharge due to pt anticipated to be at baseline. Recommend patient returns to prior setting with prior services. OT Equipment Recommendations  Equipment Needed: yes  Other: assess need for shower chair    Assessment   Performance deficits / Impairments: Decreased functional mobility ; Decreased balance;Decreased high-level IADLs;Decreased strength  Assessment: pt not at baseline and would benefit from ongoing skilled OT services in order to return to PLOF  Treatment Diagnosis: R TKA, pt presents with the above stated deficits  Prognosis: Good  Decision Making: Low Complexity  History: pt indpendent at baseline  Assistance / Modification: CGA to SBA  OT Education: ADL Adaptive Strategies  Patient Education: eval, POC, discharge, WB status, precautions-pt independent with education  REQUIRES OT FOLLOW UP: Yes  Activity Tolerance  Activity Tolerance: Patient Tolerated treatment well  Safety Devices  Safety Devices in place: Yes  Type of devices: All fall risk precautions in place; Left in chair;Nurse notified;Call light within reach; Patient at risk for falls;Gait belt; Chair alarm in place           Patient Diagnosis(es): The encounter diagnosis was Preop testing. has a past medical history of Bilateral leg edema, Celiac disease, Hypertension, Hypothyroidism, IBS (irritable bowel syndrome), Indigestion, OCD (obsessive compulsive disorder), and PONV (postoperative nausea and vomiting). has a past surgical history that includes lumbar discectomy (); Hand surgery (Left, 2015); Rotator cuff repair (Right); Ventricular ablation surgery;  Endometrial ablation; and Total knee arthroplasty (Left, 10/1/2020).     Treatment Diagnosis: R TKA, pt presents with the above stated deficits      Restrictions  Restrictions/Precautions  Restrictions/Precautions: Weight Bearing, Fall Risk(high fall risk, WBAT on R LE)  Lower Extremity Weight Bearing Restrictions  Right Lower Extremity Weight Bearing: Weight Bearing As Tolerated  Position Activity Restriction  Other position/activity restrictions: s/p: R TKA  10/1    Subjective   General  Chart Reviewed: Yes  Family / Caregiver Present: Yes(spouse present)  Diagnosis: R TKA  Subjective  Subjective: pt supine upon arrival, agreeable to OT eval, on 3 L O2  Patient Currently in Pain: Yes(achey, pt did not rate)  Pain Assessment  Pain Assessment: 0-10  Pain Level: 3  Pain Type: Surgical pain;Acute pain  Response to Pain Intervention: Patient Satisfied  Vital Signs  Level of Consciousness: Alert  Patient Currently in Pain: Yes(achey, pt did not rate)  Oxygen Therapy  SpO2: 95 %  Social/Functional History  Social/Functional History  Lives With: Spouse  Type of Home: House  Home Layout: Two level  Home Access: Stairs to enter with rails  Entrance Stairs - Number of Steps: 8-10  Entrance Stairs - Rails: Left  Bathroom Shower/Tub: Tub/Shower unit  Bathroom Toilet: Standard  Bathroom Accessibility: Accessible  Home Equipment: Rolling walker  ADL Assistance: Independent  Homemaking Assistance: Independent  Homemaking Responsibilities: Yes  Ambulation Assistance: Independent  Transfer Assistance: Independent  Active : Yes  Mode of Transportation: Car  Leisure & Hobbies: shopping, going out to dinner, walking the dogs, general fitness       Objective        Orientation  Overall Orientation Status: Within Normal Limits  Observation/Palpation  Posture: Good  Balance  Sitting Balance: Supervision  Standing Balance: Contact guard assistance  Standing Balance  Time: ~10-12 minutes  Activity: functional mobility in hallway ~50 ft total  Comment: rw used  Functional Mobility  Functional - Mobility Device: Rolling Walker  Activity: Other  Assist Level: Contact guard assistance  ADL  LE Dressing: Moderate assistance(donning underwear and pants)  Tone RUE  RUE Tone: Normotonic  Tone LUE  LUE Tone: Normotonic  Coordination  Movements Are Fluid And Coordinated: Yes     Bed mobility  Supine to Sit: Stand by assistance  Scooting: Stand by assistance  Comment: HOB raised, inititally dizzy upon sitting, subsided with time  Transfers  Sit to stand: Contact guard assistance  Stand to sit: Contact guard assistance  Transfer Comments: EOB>chair     Cognition  Overall Cognitive Status: WNL  Perception  Overall Perceptual Status: WFL              LUE AROM (degrees)  LUE AROM : WNL  RUE AROM (degrees)  RUE AROM : WNL  LUE Strength  Gross LUE Strength: WNL  RUE Strength  Gross RUE Strength:  WNL                   Plan   Plan  Times per week: 7  Times per day: Daily  Current Treatment Recommendations: Strengthening, Balance Training, Self-Care / ADL, Home Management Training             AM-PAC Score        AM-[facility] Inpatient Daily Activity Raw Score: 18 (10/01/20 1521)  AM-PAC Inpatient ADL T-Scale Score : 38.66 (10/01/20 1521)  ADL Inpatient CMS 0-100% Score: 46.65 (10/01/20 1521)  ADL Inpatient CMS G-Code Modifier : CK (10/01/20 1521)    Goals  Short term goals  Time Frame for Short term goals: discharge  Short term goal 1: bed mobility mod I  Short term goal 2: functional ADL transfer mod I  Short term goal 3: functional mobility with RW mod I  Short term goal 4: UB/LB ADLs mod I  Short term goal 5: tub transfer mod I  Patient Goals   Patient goals : get back to typical activities       Therapy Time   Individual Concurrent Group Co-treatment   Time In 1428         Time Out 1502         Minutes 34           Timed Code Treatment Minutes:   17 minutes    Total Treatment Minutes:  34 minutes      Isabelle Vizcaino OTR/L [id number]      Isabelle Vizcaino OT

## 2020-10-01 NOTE — PROGRESS NOTES
Physical Therapy    Facility/Department: [address] ORTHO/NEURO NURSING  Initial Assessment    NAME: Rafael Barroso  : 1960  MRN: [id number]    Date of Service: 10/1/2020    Discharge Recommendations:  Rafael Barroso scored a 19/24 on the AM-PAC short mobility form. Current research shows that an AM-PAC score of 18 or greater is typically associated with a discharge to the patient's home setting. Based on the patient's AM-PAC score and their current functional mobility deficits, it is recommended that the patient have 2-3 sessions per week of Physical Therapy at d/c to increase the patient's independence. At this time, this patient demonstrates the endurance and safety to discharge home with HHPT and a follow up treatment frequency of 2-3x/wk. Please see assessment section for further patient specific details. If patient discharges prior to next session this note will serve as a discharge summary. Please see below for the latest assessment towards goals. HOME HEALTH CARE: LEVEL 3 SAFETY     - Initial home health evaluation to occur within 24-48 hours, in patient home   - Therapy evaluations in home within 24-48 hours of discharge; including DME and home safety   - Frontload therapy 5 days, then 3x a week   - Therapy to evaluate if patient has [address] needs for personal care   -  evaluation within 24-48 hours, includes evaluation of resources and insurance to determine AL, [place], LTC, and Medicaid options         PT Equipment Recommendations  Equipment Needed: No(has RW at home)    Assessment   Body structures, Functions, Activity limitations: Decreased functional mobility ; Decreased ADL status; Decreased strength;Decreased balance  Assessment: Pt presents with level of functional mobility below baseline s/p L TKA. Pt performed bed mobility with SBA and transfers/ambulation with CGA.  She will require skilled therapy services to address listed impairments to progress to Caregiver Present: Yes  Diagnosis: Primary osteoarthritis of the L knee; s/p L TKA 10/1  Follows Commands: Within Functional Limits  Subjective  Subjective: Pt presents supine in bed; agreeable to PT/OT this session  Pain Screening  Patient Currently in Pain: Yes  Pain Assessment  Pain Assessment: 0-10  Pain Level: 3  Pain Type: Surgical pain;Acute pain  Pain Location: Knee  Pain Orientation: Left  Pain Descriptors: Aching;Discomfort  Pain Frequency: Continuous  Non-Pharmaceutical Pain Intervention(s): Repositioned  Response to Pain Intervention: Patient Satisfied  Vital Signs  Patient Currently in Pain: Yes  Oxygen Therapy  SpO2: 95 %  Pulse Oximeter Device Location: Finger  O2 Device: Nasal cannula  O2 Flow Rate (L/min): 2 L/min       Orientation  Orientation  Overall Orientation Status: Within Normal Limits  Social/Functional History  Social/Functional History  Lives With: Spouse  Type of Home: House  Home Layout: Two level  Home Access: Stairs to enter with rails  Entrance Stairs - Number of Steps: 8-10  Entrance Stairs - Rails: Left  Bathroom Shower/Tub: Tub/Shower unit  Bathroom Toilet: Standard  Bathroom Accessibility: Accessible  Home Equipment: Rolling walker  Receives Help From: Family  ADL Assistance: Independent  Homemaking Assistance: Independent  Homemaking Responsibilities: Yes  Ambulation Assistance: Independent  Transfer Assistance: Independent  Active : Yes  Mode of Transportation: Car  Leisure & Hobbies: shopping, going out to dinner, walking the dogs, general fitness       Objective     Observation/Palpation  Posture: Good    AROM RLE (degrees)  RLE AROM: WNL  AROM LLE (degrees)  LLE AROM : Exceptions  LLE General AROM: knee: 0-15-78 degrees  Strength RLE  Strength RLE: WFL  Strength LLE  Strength LLE: Exception  Comment: L knee strength not assessed secondary to s/p TKA     Sensation  Overall Sensation Status: WNL  Bed mobility  Bridging: Stand by assistance  Rolling to Right: Stand by assistance  Supine to Sit: Stand by assistance  Scooting: Stand by assistance  Comment: HOB raised; initially dizzy upon sitting, subsided with time  Transfers  Sit to Stand: Contact guard assistance  Stand to sit: Contact guard assistance  Comment: B UE to pushoff bed/RW for transfers  Ambulation  Ambulation?: Yes  Ambulation 1  Surface: level tile  Device: Rolling Walker  Other Apparatus: O2(2L)  Assistance: Contact guard assistance  Quality of Gait: shortened (L) stance phase; lack of pushoff through (L) stance phase; lack of (L) knee flexion through swing phase  Gait Deviations: Slow Alison; Increased KALEY; Decreased step length;Decreased step height  Distance: ~30ft  Comments: Ambulation distance limited by soreness and apprehension  Stairs/Curb  Stairs?: No     Balance  Posture: Good  Sitting - Static: Good  Sitting - Dynamic: Good  Standing - Static: Good  Standing - Dynamic: Fair(L knee instability with SL WBing)  Exercises  Comments: Pt provided with handout detailing post-surgical exercise protocol. Plan   Plan  Times per week: 7x (BID)  Times per day: Twice a day  Current Treatment Recommendations: Strengthening, ROM, Balance Training, Functional Mobility Training, Transfer Training, Endurance Training, ADL/Self-care Training, Gait Training, Stair training, Neuromuscular Re-education, Manual Therapy - Soft Tissue Mobilization, Safety Education & Training, Home Exercise Program, Pain Management, Manual Therapy - Joint Manipulation, Patient/Caregiver Education & Training, Modalities, Positioning  Safety Devices  Type of devices:  All fall risk precautions in place, Call light within reach, Chair alarm in place, Gait belt, Nurse notified, Left in chair  Restraints  Initially in place: No                                                     AM-PAC Score  AM-PAC Inpatient Mobility Raw Score : 19 (10/01/20 1634)  AM-PAC Inpatient T-Scale Score : 45.44 (10/01/20 1634)  Mobility Inpatient CMS 0-100% Score: 41.77 (10/01/20 1634)  Mobility Inpatient CMS G-Code Modifier : CK (10/01/20 1634)          Goals  Short term goals  Time Frame for Short term goals: discharge  Short term goal 1: Pt will be independent with bed mobility  Short term goal 2: Pt will perform transfers with LRAD mod-I  Short term goal 3: Pt will ambulate 50ft with LRAD mod-I  Short term goal 4: Pt will ascend/descend one flight of stairs with HR SBA  Short term goal 5: Pt will demonstrate 90 degs of L knee flexion AROM  Short term goal 6: Pt will perform car transfer with LRAD and SBA  Long term goals  Time Frame for Long term goals : LTG = STG  Patient Goals   Patient goals : walk without pain       Therapy Time   Individual Concurrent Group Co-treatment   Time In 1428         Time Out 1504         Minutes 36         Timed Code Treatment Minutes: 21 Minutes     Claudean Darby, SPT    PT providing direct supervision during session and assisting in making skilled judgements throughout session.   [id number] Horacio Nickerson PT, DPT [id number]   [id number] Horacio Nickerson, PT

## 2020-10-01 NOTE — ADDENDUM NOTE
Addendum  created 10/01/20 1033 by Mathews Landau, MD    Clinical Note Signed, Intraprocedure Blocks edited

## 2020-10-01 NOTE — ANESTHESIA POSTPROCEDURE EVALUATION
Department of Anesthesiology  Postprocedure Note    Patient: Ayo Zuleta  MRN: [id number]  YOB: 1960  Date of evaluation: 10/1/2020  Time:  10:30 AM     Procedure Summary     Date:  10/01/20 Room / Location:  [address]    Anesthesia Start:  0700 Anesthesia Stop:  6251    Procedure:  LEFT ROBOTIC TOTAL KNEE ARTHROPLASTY (66732, 75042)- ESCALANTE & NEPHEW ADVANCED (Left Knee) Diagnosis:  (M17.12  OSTEOARTHRITIS KNEE)    Surgeon:  Adrienne Potts MD Responsible Provider:  Belem Taylor MD    Anesthesia Type:  general, MAC, regional, spinal ASA Status:  2          Anesthesia Type: general, MAC, regional, spinal    Blair Phase I: Blair Score: 9    Blair Phase II:      Last vitals: Reviewed and per EMR flowsheets.        Anesthesia Post Evaluation    Level of consciousness: awake  Complications: no

## 2020-10-01 NOTE — ANESTHESIA PROCEDURE NOTES
Peripheral Block    Patient location during procedure: pre-op  Start time: 10/1/2020 6:57 AM  End time: 10/1/2020 6:57 AM  Staffing  Anesthesiologist: Mariana Fields MD  Preanesthetic Checklist  Completed: patient identified, site marked, surgical consent, pre-op evaluation, timeout performed, IV checked, risks and benefits discussed, monitors and equipment checked, anesthesia consent given, oxygen available and patient being monitored  Peripheral Block  Block type: Sciatic  Laterality: left  Injection technique: single-shot  Procedures: Doppler guided  Popliteal  Needle  Needle type: combined needle/nerve stimulator   Needle gauge: 20 G  Needle length: 10 cm  Needle localization: ultrasound guidance  Assessment  Slow fractionated injection: yes  Hemodynamics: stable  Additional Notes  IPACK nerve block w US; 20 ml 0.25% bupi  Reason for block: post-op pain management

## 2020-10-01 NOTE — PROGRESS NOTES
Pt transferred to room 4479 at this time. A&O with no signs of distress. Report given to [facility], [facility].. V/u and denies questions or further needs at this time.

## 2020-10-01 NOTE — PROGRESS NOTES
Met with patient and family, Christian Goode at bedside, patient is drowsy. discussed role of nurse navigator and gave contact information. Reviewed reasons to call with questions or concerns, importance of TEDS, Incentive spirometer, pain medication, and physical and occupational therapy. 2/4 bed rails up, bed in lowest position, fall precautions in place, call light within reach. Pulses present bilaterally +2, no drainage or odor noted at surgical dressing left knee, Dressing clean, dry, and intact. Ice in place. Tim and scds on bilateral legs. Neurovascular checks performed and WNLs, patient denies numbness or tingling. DC Plan: home therapy vs op pt. Spouse, Dao to transport patient.   DME needs:robert Hooks   Orthopedic Nurse Navigator  Phone number: [phone]

## 2020-10-01 NOTE — PROGRESS NOTES
Pt stable and able to be transferred from PACU to room 479. A&O , VSS, with no complaints at this time. 4T called and notified that pt is being transferred out of PACU and back to room.

## 2020-10-01 NOTE — ANESTHESIA PROCEDURE NOTES
Peripheral Block    Patient location during procedure: pre-op  Start time: 10/1/2020 6:45 AM  End time: 10/1/2020 6:47 AM  Staffing  Anesthesiologist: Modesto Castillo MD  Performed: anesthesiologist   Preanesthetic Checklist  Completed: patient identified, site marked, surgical consent, pre-op evaluation, timeout performed, IV checked, risks and benefits discussed, monitors and equipment checked, anesthesia consent given, oxygen available and patient being monitored  Peripheral Block  Patient position: supine  Prep: ChloraPrep  Patient monitoring: cardiac monitor, continuous pulse ox, frequent blood pressure checks and IV access  Block type: Femoral  Laterality: left  Injection technique: single-shot  Procedures: ultrasound guided  Local infiltration: lidocaine  Infiltration strength: 1 %  Dose: 3 mL  Adductor canal (Low Femoral)  Provider prep: mask and sterile gloves  Local infiltration: lidocaine  Needle  Needle gauge: 21 G  Needle length: 10 cm  Needle localization: ultrasound guidance  Assessment  Injection assessment: negative aspiration for heme, no paresthesia on injection and local visualized surrounding nerve on ultrasound  Paresthesia pain: none  Slow fractionated injection: yes  Hemodynamics: stable  Additional Notes  30 ml 0.5% bupi w US. No complications.   Sterile technique          Reason for block: post-op pain management and at surgeon's request

## 2020-10-01 NOTE — ANESTHESIA PRE PROCEDURE
Department of Anesthesiology  Preprocedure Note       Name:  Jeanie Hwang   Age:  64 y.o.  :  1960                                          MRN:  [id number]         Date:  10/1/2020      Surgeon: Tristan Harvey):  Adalgisa Royal MD    Procedure: Procedure(s):  LEFT ROBOTIC TOTAL KNEE ARTHROPLASTY (56694, 60023)- ESCALANTE [employer] NEPHEW ADVANCED    Medications prior to admission:   Prior to Admission medications    Medication Sig Start Date End Date Taking? Authorizing Provider   mupirocin (BACTROBAN) 2 % ointment Apply to inside of each nostril Q12 x  5 days before surgery including morning of surgery 20  Yes Adalgisa Royal MD   felodipine (PLENDIL) 2.5 MG extended release tablet Take 2.5 mg by mouth daily   Yes Historical Provider, MD   losartan (COZAAR) 100 MG tablet  20  Yes Historical Provider, MD   Probiotic Product (PROBIOTIC DAILY PO) Take by mouth   Yes Historical Provider, MD   metoprolol (TOPROL-XL) 25 MG XL tablet Take 100 mg by mouth 2 times daily TAKES 1/2 TAB TWICE A DAY   Yes Historical Provider, MD   Bisacodyl (DULCOLAX PO) Take 10 mg by mouth daily    Yes Historical Provider, MD   levothyroxine (SYNTHROID) 100 MCG tablet Take 88 mcg by mouth Daily. Yes Historical Provider, MD   clomiPRAMINE (ANAFRANIL) 75 MG capsule Take 75 mg by mouth nightly. Yes Historical Provider, MD   omeprazole (PRILOSEC) 10 MG capsule Take 20 mg by mouth daily    Yes Historical Provider, MD   furosemide (LASIX) 20 MG tablet Take 20 mg by mouth Twice a Week prn 20   Historical Provider, MD   BIOTIN PO Take 3,000 mcg by mouth daily. Historical Provider, MD   vitamin B-1 (THIAMINE) 100 MG tablet Take 100 mg by mouth daily. Historical Provider, MD   calcium citrate (CALCITRATE) 950 MG tablet Take 1 tablet by mouth daily. Historical Provider, MD   Ascorbic Acid (VITAMIN C) 500 MG tablet Take 500 mg by mouth daily.     Historical Provider, MD       Current medications:    Current Facility-Administered Medications   Medication Dose Route Frequency Provider Last Rate Last Dose    lactated ringers infusion   Intravenous Continuous Argelia Villavicencio MD        lidocaine PF 1 % injection 0.5 mL  0.5 mL Intradermal Once Argelia Villavicencio MD        ceFAZolin (ANCEF) 2 g in dextrose 5 % 100 mL IVPB  2 g Intravenous On Call to [address], MD        ortho mix injection   Injection On Call Argelia Villavicencio MD        tranexamic acid (CYKLOKAPRON) 1,000 mg in sodium chloride 0.9 % 50 mL IVPB  1,000 mg Intravenous On Call to [address], MD        tranexamic acid (CYKLOKAPRON) 1,000 mg in sodium chloride 0.9 % 50 mL IVPB  1,000 mg Intravenous Once Argelia Villavicencio MD        vancomycin (VANCOCIN) 1,500 mg in dextrose 5 % 250 mL IVPB  1,500 mg Intravenous Once Argelia Villavicencio MD        HYDROmorphone (DILAUDID) injection 0.25 mg  0.25 mg Intravenous Q5 Min PRN Merari Contreras MD        fentaNYL (SUBLIMAZE) injection 50 mcg  50 mcg Intravenous Q5 Min PRN Merair Contreras MD        HYDROmorphone (DILAUDID) injection 0.25 mg  0.25 mg Intravenous Q5 Min PRN Merari Contreras MD        HYDROmorphone (DILAUDID) injection 0.5 mg  0.5 mg Intravenous Q5 Min PRN Merari Contreras MD        oxyCODONE (ROXICODONE) immediate release tablet 5 mg  5 mg Oral PRN Merari Contreras MD        Or    oxyCODONE (ROXICODONE) immediate release tablet 10 mg  10 mg Oral PRN Merari Contreras MD        diphenhydrAMINE (BENADRYL) injection 12.5 mg  12.5 mg Intravenous Once PRN Merari Contreras MD        promethazine (PHENERGAN) injection 6.25 mg  6.25 mg Intravenous PRN Merari Contreras MD        labetalol (NORMODYNE;TRANDATE) injection 5 mg  5 mg Intravenous Q10 Min PRN Merari Contreras MD        meperidine (DEMEROL) injection 12.5 mg  12.5 mg Intravenous Q5 Min PRN Merari Contreras MD           Allergies:     Allergies   Allergen Reactions    Dextrans     Oxycodone-Acetaminophen Palpitations Problem List:    Patient Active Problem List   Diagnosis Code    Spider veins I78.1    Varicose veins I83.90    Primary localized osteoarthrosis, hand M19.049    Ganglion of joint M67.40       Past Medical History:        Diagnosis Date    Bilateral leg edema     Celiac disease     Hypertension     Hypothyroidism     IBS (irritable bowel syndrome)     Indigestion     OCD (obsessive compulsive disorder)     PONV (postoperative nausea and vomiting)        Past Surgical History:        Procedure Laterality Date    ENDOMETRIAL ABLATION      HAND SURGERY Left 2/2015    LUMBAR DISCECTOMY  2013    X 2    ROTATOR CUFF REPAIR Right     VENTRICULAR ABLATION SURGERY         Social History:    Social History     Tobacco Use    Smoking status: Never Smoker    Smokeless tobacco: Never Used   Substance Use Topics    Alcohol use: Yes     Alcohol/week: 0.0 standard drinks     Comment: 8 DRINKS A WEEK                                Counseling given: Not Answered      Vital Signs (Current):   Vitals:    09/22/20 1534 10/01/20 0616   Weight: 200 lb (90.7 kg) 199 lb 4 oz (90.4 kg)   Height: 5' 6\" (1.676 m) 5' 6\" (1.676 m)                                              BP Readings from Last 3 Encounters:   05/16/16 (!) 138/92   03/21/16 (!) 152/99   02/08/16 (!) 148/96       NPO Status:                                                                                 BMI:   Wt Readings from Last 3 Encounters:   10/01/20 199 lb 4 oz (90.4 kg)   08/18/20 163 lb (73.9 kg)   07/17/20 163 lb (73.9 kg)     Body mass index is 32.16 kg/m².     CBC:   Lab Results   Component Value Date    WBC 8.7 09/25/2020    RBC 4.04 09/25/2020    HGB 14.1 09/25/2020    HCT 41.2 09/25/2020    .2 09/25/2020    RDW 12.6 09/25/2020     09/25/2020       CMP:   Lab Results   Component Value Date     09/25/2020    K 4.9 09/25/2020    CL 92 09/25/2020    CO2 24 09/25/2020    BUN 5 09/25/2020    CREATININE <0.5 09/25/2020

## 2020-10-01 NOTE — PROGRESS NOTES
[facility] Orthopedic Surgery   Progress Note    CHIEF COMPLAINT/DIAGNOSIS: S/p left Total Knee Arthroplasty    SUBJECTIVE: The patient was seen sitting up in bed with  at bedside; describes no knee pain currently. Reports ongoing tingling in the leg. They live in ranch style home with 7 steps to entire. No assistive devices at baseline. They have a walker at home. OBJECTIVE  Physical    VITALS:  /75   Pulse 99   Temp 98.3 °F (36.8 °C) (Temporal)   Resp 14   Ht 5' 6\" (1.676 m)   Wt 199 lb 4 oz (90.4 kg)   SpO2 93%   BMI 32.16 kg/m²     GENERAL: Alert and oriented x3, in no acute distress. MUSCULOSKELETAL: Able to dorsi and plantarflex the ankle without issue. INCISION:  Covered with post-op dressing/ACE.  ROM: left knee ROM deferred. Sensory:  Intact to light touch in peroneal and tibial distributions. Vascular:   2+ DP pulses with brisk cap refill;  calf soft and nontender    Data    ALL MEDICATIONS HAVE BEEN REVIEWED    CBC:   Recent Labs     10/01/20  1008   HGB 13.4   HCT 38.4     BMP: No results for input(s): NA, K, CL, CO2, PHOS, BUN, CREATININE in the last 72 hours. Invalid input(s): CA  INR: No results for input(s): INR in the last 72 hours. Post-op films show stable total knee arthroplasty without acute complication. ASSESSMENT:  S/p left Total Knee Arthroplasty (10/1/20), POD#0  HTN  Hypothyroidism  GERD    PLAN:   - WB status:  WBAT; reviewed post op precautions  - DVT prophylaxis: ASA 325mg BID x 14 days  - PT/OT  - D/C Plan: potentially home tomorrow with home care. - Pain Control: Current regimen. Due to orthopaedic surgical procedure/condition, patient may require pain medication for up to 6-8 weeks. Follow-up with Dr. Denia Argueta in 2 weeks.   [phone]    ASHELY Martell-CNP  10/1/2020  10:29 AM

## 2020-10-01 NOTE — PROGRESS NOTES
Pt arrived from OR to PACU bay 3. Reported received from [address] [place] [address] staff. Pt arouses to voice. Surgical incisions dressings in place to LLE. Pt on 3L NC, NSR, and VSS. Will continue to monitor.

## 2020-10-01 NOTE — CONSULTS
Consult -Internal Medicine  Dr. Jacob Cachil DeHe  10/1/2020      PCP: Lori Dewey  Referring Physician: Richard Orozco MD    Code Status: No Order  Current Diet: No diet orders on file      Cc: Kasia Ramos is [place] y.o. female who presents with Primary osteoarthritis of left knee. Active Hospital Problems    Diagnosis Date Noted    Primary osteoarthritis of left knee [M17.12] 10/01/2020    HTN (hypertension) [I10] 10/01/2020    Hypothyroid [E03.9] 10/01/2020    GERD (gastroesophageal reflux disease) [K21.9] 10/01/2020     HPI: Kasia Ramos has been admitted by Richard Orozco MD with L knee pain. Pt is a 60yo F with left knee pain that is been going on over the last several months but much worse especially over the past month. She denies any particular injury or injury to her knee which onset of the pain. She is noticed swelling as well as difficulty with range of motion and pain with any type of weightbearing. Her greatest pain is over the medial side of the knee. She denies radicular pain. Pain is constant, rated 7/10. Aching in nature. Pain is so severe that she cannot do her usual activities. As it has not improved with conservative measures, surgery is recommended    Pt has undergone L TKA without any apparentcomplications. Pt is doing well post operatively. Pain is controlled at this time. I have been asked to see the patient for evaluation of her internal medicine issues:  has a past medical history of Bilateral leg edema, Celiac disease, Hypertension, Hypothyroidism, IBS (irritable bowel syndrome), Indigestion, OCD (obsessive compulsive disorder), and PONV (postoperative nausea and vomiting). .  Home meds have been reveiwed and restartedas indicated. Pt denies having other complaints at this time.       Doing ok post op  Pain appears to be controlled  Has not yet worked with therapy    Electronic chart reviewed  Home meds reviewed and restarted as indicated  Op note, anesthesia flowsheet reviewed  Case d/w nursing       Problem list of hospitalization thus far: Active Hospital Problems    Diagnosis    Primary osteoarthritis of left knee [M17.12]    HTN (hypertension) [I10]    Hypothyroid [E03.9]    GERD (gastroesophageal reflux disease) [K21.9]         Review of Systems: (1 system for EPF, 2-9 for detailed, 10+ for comprehensive)  Review of Systems   Constitutional: Negative for chills and fever. HENT: Negative for sinus pressure and sinus pain. Eyes: Negative for pain and redness. Respiratory: Negative for cough and shortness of breath. Cardiovascular: Negative for chest pain and leg swelling. Gastrointestinal: Negative for nausea and vomiting. Endocrine: Negative for polydipsia and polyphagia. Genitourinary: Negative for flank pain and frequency. Musculoskeletal: Positive for joint swelling. Allergic/Immunologic: Negative for food allergies. Neurological: Negative for dizziness and numbness. Hematological: Does not bruise/bleed easily. Psychiatric/Behavioral: Negative for decreased concentration. The patient is not nervous/anxious.             Past Medical History:   Past Medical History:   Diagnosis Date    Bilateral leg edema     Celiac disease     Hypertension     Hypothyroidism     IBS (irritable bowel syndrome)     Indigestion     OCD (obsessive compulsive disorder)     PONV (postoperative nausea and vomiting)        Past Surgical History:   Past Surgical History:   Procedure Laterality Date    ENDOMETRIAL ABLATION      HAND SURGERY Left 2/2015    LUMBAR DISCECTOMY  2013    X 2    ROTATOR CUFF REPAIR Right     VENTRICULAR ABLATION SURGERY         Social History:   Social History     Tobacco History     Smoking Status  Never Smoker    Smokeless Tobacco Use  Never Used          Alcohol History     Alcohol Use Status  Yes Drinks/Week  0 Standard drinks or equivalent per week Amount  0.0 standard drinks of alcohol/wk Comment  8 DRINKS A WEEK          Drug Use     Drug Use Status  No          Sexual Activity     Sexually Active  Not Asked                Fam History:   Family History   Problem Relation Age of Onset    High Blood Pressure Mother        PFSH: The above PMHx, PSHx, SocHx, FamHx has been reviewed by myself. (1 area for detailed, 2-3 for comprehensive)      Code Status: No Order    Meds - following list ofhome medications from electronic chart has been reviewed by myself  Prior to Admission medications    Medication Sig Start Date End Date Taking? Authorizing Provider   mupirocin (BACTROBAN) 2 % ointment Apply to inside of each nostril Q12 x  5 days before surgery including morning of surgery 9/28/20  Yes Ken Cordova MD   felodipine (PLENDIL) 2.5 MG extended release tablet Take 2.5 mg by mouth daily   Yes Historical Provider, MD   losartan (COZAAR) 100 MG tablet  7/23/20  Yes Historical Provider, MD   Probiotic Product (PROBIOTIC DAILY PO) Take by mouth   Yes Historical Provider, MD   metoprolol (TOPROL-XL) 25 MG XL tablet Take 100 mg by mouth 2 times daily TAKES 1/2 TAB TWICE A DAY   Yes Historical Provider, MD   Bisacodyl (DULCOLAX PO) Take 10 mg by mouth daily    Yes Historical Provider, MD   levothyroxine (SYNTHROID) 100 MCG tablet Take 88 mcg by mouth Daily. Yes Historical Provider, MD   clomiPRAMINE (ANAFRANIL) 75 MG capsule Take 75 mg by mouth nightly. Yes Historical Provider, MD   omeprazole (PRILOSEC) 10 MG capsule Take 20 mg by mouth daily    Yes Historical Provider, MD   furosemide (LASIX) 20 MG tablet Take 20 mg by mouth Twice a Week prn 9/16/20   Historical Provider, MD   BIOTIN PO Take 3,000 mcg by mouth daily. Historical Provider, MD   vitamin B-1 (THIAMINE) 100 MG tablet Take 100 mg by mouth daily. Historical Provider, MD   calcium citrate (CALCITRATE) 950 MG tablet Take 1 tablet by mouth daily. Historical Provider, MD   Ascorbic Acid (VITAMIN C) 500 MG tablet Take 500 mg by mouth daily. Historical Provider, MD         Allergies   Allergen Reactions    Dextrans     Oxycodone-Acetaminophen Palpitations             EXAM: (2-7 system forEPF/Detailed, ?8 for Comprehensive)  /87   Pulse 99   Temp 98.9 °F (37.2 °C) (Oral)   Resp 14   Ht 5' 6\" (1.676 m)   Wt 199 lb 4 oz (90.4 kg)   SpO2 95%   BMI 32.16 kg/m²   Constitutional: vitals asabove: alert, appears stated age and cooperative  Psychiatric: normal insight and judgment, oriented to person, place, time, and general circumstances  Head: Normocephalic, without obvious abnormality, atraumatic  Eyes:lids and lashes normal, conjunctivae and sclerae normal and pupils equal, round, reactive to light and accomodation  EMNT: external ears normal, lips normal  Neck: no adenopathy, supple, symmetrical, trachea midline and thyroid not enlarged, symmetric, no tenderness/mass/nodules   Respiratory: clear to auscultation and percussion bilaterally with normal respiratory effort  Cardiovascular: normal rate, regular rhythm, normal S1 and S2 and no gallops  Gastrointestinal: soft, non-tender, non-distended, normal bowel sounds, no masses or organomegaly  Lymphatic:   Extremities: no edema, knee dressing CDI  Skin: No rashes or nodules noted.   Neurologic:    LABS:  Labs Reviewed   POCT GLUCOSE - Abnormal; Notable for the following components:       Result Value    POC Glucose 163 (*)     All other components within normal limits    Narrative:     Performed at:  [facility] [address] [facility], [facility] [place]   Phone [phone]   [place]    Narrative:     Performed at:  [facility]  [address] [facility], [facility] [place]   Phone [phone]   TYPE AND SCREEN    Narrative:     Performed at:  [facility]  [address] [facility]   Phone [phone]         IMAGING:  Imaging has been reviewed in the computerized chart  No results found. EKG:            MEDICAL DECISION MAKING:    Principal Problem:    Primary osteoarthritis of left knee -New Problem to me. Doing ok post op. Post op knee film reviewed, shows TKA in good alignment. Pain controlled - on iv meds  Plan: Continue on post-operative pathway. PT/OT to see patient. Continue pain control - on IV pain meds acutely post op. Work on transitioning to oral pain meds when possible. Will follow serial h/h to monitor for acute blood loss anemia - labs ordered for tomorrow. Active Problems:    HTN (hypertension) -Established problem. Stable. 131/87  Plan: Pt home BP meds reviewed and will be continued. IV Hydralazine ordered for control of extremely high blood pressures   Will monitor labs to assess Creat/K for possible complications of medications. Hypothyroid -Established problem. Stable. Plan: cont on synthroid. Can check TSH is pt develops sx    GERD (gastroesophageal reflux disease) -Established problem. Stable. No reflux compalints  Plan: Continue present orders/plan. Diagnoses as listed above, designated as new or established and plan outlined for each. Data Reviewed:   (1) Lab tests were reviewed or ordered. (1) Radiology tests were reviewed or ordered. (1) Medical test (Echo, EKG, PFT/melita) were not ordered. (1) History was not obtained from someone other than patient  (1) Old records  were reviewed - see HPI/MDM for pertinent details if review done. (2) Case was discussed with another health care provider: Dr Timothy Charlton  (2) Imaging was viewed by myself. (2) EKG  was not viewed by myself. Thanks for the consult! Will follow along daily while patient is in house.       David Orantes  10/1/2020

## 2020-10-01 NOTE — OP NOTE
[facility] [address]                     [address] [facility], [address] [facility]                                OPERATIVE REPORT    PATIENT NAME: Phyllis Prince                  :        1960  MED REC NO:   [id number]                          ROOM:       5648  ACCOUNT NO:   [de-identified]                           ADMIT DATE: 10/01/2020  PROVIDER:     Declan Kolb MD    DATE OF PROCEDURE:  10/01/2020    PREOPERATIVE DIAGNOSIS:  Left knee endlstage osteoarthritis. POSTOPERATIVE DIAGNOSIS:  Left knee endlstage osteoarthritis. OPERATION PERFORMED:  Left total knee arthroplasty with robotic  assistance. PRIMARY SURGEON:  Declan Kolb MD    ANESTHESIA:  General endotracheal.  The patient also did receive an  adductor canal block. IMPLANTS:  [employer]hew JOURNEY II Bi-Cruciate Stabilized Oxinium  femur size 5, a [employer] II size 4 _____, Vernadine Ester and  Nephew BCS polyethylene size 12 mm, and a Smith [employer]hew anatomic  medialized patella size 32 mm. BLOOD LOSS:  100 mL. TOURNIQUET TIME:  80 minutes at 300 mmHg. CONDITION:  The patient postoperatively was stable. HISTORY:  The patient is a 27-year-old female with a longstanding  history of left knee end-stage osteoarthritis. She has failed  nonoperative management with viscosupplementation injections, cortisone  injections, modification of activities, and physical therapy, continues  to have significant left knee pain that is affecting her quality of life  and her ability to ambulate. We discussed further treatment options. I  did recommend left total knee arthroplasty. After discussing the risks  and benefits with her, informed consent was obtained. OPERATIVE PROCEDURE:  The patient was seen in the preoperative holding  area. The left knee was confirmed to be the operative extremity and it  was marked at that period of time. She did receive 2 gm of Ancef  preoperatively.   She was then brought back to the operating room in the  supine position. General endotracheal anesthesia was started per the  Anesthesia Service. She was then positioned supine on the operating  room table with all bony prominences padded. A tourniquet was placed  upon the patient's left thigh. The left lower extremity at that time  was prepped and draped in the standard sterile fashion. A standard anterior midline incision was made directly over the anterior  aspect of the left knee. It was carried down sharply through the skin  and subcutaneous tissues. A medial parapatellar arthrotomy at that time  was performed. The patella was everted laterally. Fat pad was excised. Soft tissue dissection continued along the medial subperiosteal soft  tissue sleeve. There was end-stage degenerative change that was worse  within the medial compartment with complete loss of medial articular  cartilage. The joint was now fully exposed. We did place our trackers  for robotic navigation into both the tibia and the femur at that point  in time. We collected our initial data including range of motion of the  knee as well as our _____ as well as our gaps medially and laterally as  well as we did map out at that point in time the femur and the tibia to  give us our digital image of the femur and tibia. We now prepared our  operative plan for sizing and placement of our prosthesis. Once our plan was complete, we began by burring the distal femur with  the bur under robotic assistance. We then placed our holes for our  femoral 5linl1 cutting block. We then burred our holes also for our  tibial cutting block. We malleted the 5-in-1 cutting block on to the  femur and pinned it in place and confirmed appropriate placement with a  digital peace wing. Once we had good placement, we completed our  anterior, posterior, and chamfer cuts on the femur. We then placed the  PCL retractor and brought the knee up into flexion.   We placed our  tibial cutting block once again and confirmed placement with the digital  peace wing and completed our tibial cut. The tibial bone block was  taken out and the knee was brought into full extension. With the 9-mm  spacer block, we had slight hyperextension of the knee with good  balancing of the medial and lateral soft tissues. The knee was then  brought back up into hyperflexion. We placed our size 5 JOURNEY II BCS  Oxinium femur on to the femur. We finished box preparation with both  the reamer and the punch and placed our box on to our trial femoral  component. We then trialed the knee with a size 4 tibia with a 12 mm  polyethylene insert on. We were able to get the knee into full  extension and brought the knee up to 130 degrees of flexion with good  balancing of the medial and lateral soft tissues. We then everted the  patella. A measured resection was done on the back surface of the  patella to cut it to a thickness of 13 mm. We then reamed lug holes for  a size 32 anatomic medialized patella. We then trialed the knee with  the patella in place and also collected more data with robotic  navigation as far as our gaps and noted that we did have good stability  of the knee through full range of motion with range of motion from 0 to  131 degrees. Trial components were removed from the knee at that time. Residuals of the medial and lateral meniscus were removed at that time. We washed the knee with pulse lavage as well as with an IrriSept  antibacterial solution and injected the soft tissues with an Orthomix  local anesthetic solution. Cement was mixed on the back table at that  time. Once the cement was ready, we cemented our final components starting  with the size 4 JOURNEY II tibia, followed by a size 5 JOURNEY II BCS  Oxinium femur, followed by a size 32 anatomic medialized patella.   A 12  mm BCS polyethylene insert was held in the joint space with the knee in  full extension with an axial load while we waited for the cement to  fully cure. Once the cement fully cured, we once again trialed the knee  and the size 12 mm BCS polyethylene was found to be appropriate. Therefore, a final size 12 mm BCS polyethylene insert was placed. Knee  was once again washed with pulse lavage. Tourniquet was deflated. Electrocautery was used to obtain hemostasis. Capsular closure was done  with a #1 Stratafix suture, 2-0 Vicryl was used to reapproximate the  subcutaneous tissues, and a 3-0 Monocryl was used to close the skin. Sterile dressing was placed over the knee. Knee was placed into an Ace  wrap. The patient was awakened from anesthesia and transferred to PACU  in stable condition.         Destiny Alvarez MD    D: 10/01/2020 9:52:05       T: 10/01/2020 12:16:14     [email or web address]  Job#: [id number]     Doc#: [id number]    CC:

## 2020-10-01 NOTE — PROGRESS NOTES
Incentive Spirometry education and demonstration completed by Respiratory Therapy Yes      Response to education: Excellent     Teaching Time: 5 minutes    Minimum Predicted Vital Capacity - 593 mL. Patient's Actual Vital Capacity - 800 mL. Turning over to Nursing for routine follow-up Yes.         Electronically signed by Marni Vee RCP on 10/1/2020 at 7:41 PM

## 2020-10-01 NOTE — PROGRESS NOTES
Patient stated \" throat feels funny\" & [address] [facility] RN aware, sat 97 % pulse ox on room air x 5 min & respirations easy .

## 2020-10-01 NOTE — ANESTHESIA POSTPROCEDURE EVALUATION
Department of Anesthesiology  Postprocedure Note    Patient: Marian Jo  MRN: [id number]  YOB: 1960  Date of evaluation: 10/1/2020  Time:  10:29 AM     Procedure Summary     Date:  10/01/20 Room / Location:  [address]    Anesthesia Start:  0700 Anesthesia Stop:  9017    Procedure:  LEFT ROBOTIC TOTAL KNEE ARTHROPLASTY (85690, 39502)- ESCALANTE & NEPHEW ADVANCED (Left Knee) Diagnosis:  (M17.12  OSTEOARTHRITIS KNEE)    Surgeon:  Dorcas Sanchez MD Responsible Provider:  Carlos Herrmann MD    Anesthesia Type:  general, MAC, regional, spinal ASA Status:  2          Anesthesia Type: general, MAC, regional, spinal    Blair Phase I: Blair Score: 9    Blair Phase II:      Last vitals: Reviewed and per EMR flowsheets.        Anesthesia Post Evaluation    Level of consciousness: awake  Complications: no

## 2020-10-01 NOTE — BRIEF OP NOTE
Brief Postoperative Note      Patient: Merlene Matson  YOB: 1960  MRN: [id number]    Date of Procedure: 10/1/2020    Pre-Op Diagnosis: M17.12  OSTEOARTHRITIS KNEE    Post-Op Diagnosis: Same       Procedure(s):  LEFT ROBOTIC TOTAL KNEE ARTHROPLASTY (20985, 20254)- ESCALANTE & NEPHEW ADVANCED    Surgeon(s):  Crystal Neal MD    Assistant:  Surgical Assistant: Luciano Valentino RN  First Assistant: Dorita Carney RN    Anesthesia: General    Estimated Blood Loss (mL): less than 445     Complications: None    Specimens:   * No specimens in log *    Implants:  Implant Name Type Inv.  Item Serial No.  Lot No. LRB No. Used Action   CEMENT SMARTGHV W/ GENT 40GR MUST ORDER 20EA Cement CEMENT SMARTGHV W/ GENT 40GR MUST ORDER 20EA  JNJ: [employer] [id number] Left 2 Implanted   IMPL KNEE PATELLA COMP RESURF 32MM Knee IMPL KNEE PATELLA COMP RESURF 32MM  SMITH [id number] Left 1 Implanted   IMPL KNEE FEM COMP JOUR II OX LT SZ5 Knee IMPL KNEE FEM COMP JOUR II OX LT SZ5  SMITH [id number] Left 1 Implanted   IMPL KNEE TIB BASEPLT JOURNEY NP LT SZ 4 Knee IMPL KNEE TIB BASEPLT JOURNEY NP LT SZ 4  SMITH [id number] Left 1 Implanted   IMPL KNEE INSRT ART JRNY II SZ 3-4 12MM Knee IMPL KNEE INSRT ART JRNY II SZ 3-4 12MM  SMITH [id number] Left 1 Implanted         Drains: * No LDAs found *    Electronically signed by Crystal Neal MD on 10/1/2020 at 9:35 AM

## 2020-10-01 NOTE — PROGRESS NOTES
Time out done, 02 on @ 2 l/min per n/c then Dr Eugenio Bonner completed IPAC I adductor nerve canal block on right & patient tolerated procedure well.

## 2020-10-02 VITALS
HEART RATE: 83 BPM | OXYGEN SATURATION: 94 % | DIASTOLIC BLOOD PRESSURE: 94 MMHG | BODY MASS INDEX: 32.02 KG/M2 | SYSTOLIC BLOOD PRESSURE: 158 MMHG | HEIGHT: 66 IN | WEIGHT: 199.25 LBS | TEMPERATURE: 98.8 F | RESPIRATION RATE: 16 BRPM

## 2020-10-02 PROBLEM — D62 ACUTE BLOOD LOSS AS CAUSE OF POSTOPERATIVE ANEMIA: Status: ACTIVE | Noted: 2020-10-02

## 2020-10-02 LAB
ANION GAP SERPL CALCULATED.3IONS-SCNC: 11 MMOL/L (ref 3–16)
BASOPHILS ABSOLUTE: 0 K/UL (ref 0–0.2)
BASOPHILS RELATIVE PERCENT: 0.3 %
BUN BLDV-MCNC: 7 MG/DL (ref 7–20)
CALCIUM SERPL-MCNC: 8.8 MG/DL (ref 8.3–10.6)
CHLORIDE BLD-SCNC: 94 MMOL/L (ref 99–110)
CO2: 25 MMOL/L (ref 21–32)
CREAT SERPL-MCNC: 0.5 MG/DL (ref 0.6–1.1)
EOSINOPHILS ABSOLUTE: 0 K/UL (ref 0–0.6)
EOSINOPHILS RELATIVE PERCENT: 0.1 %
GFR AFRICAN AMERICAN: >60
GFR NON-AFRICAN AMERICAN: >60
GLUCOSE BLD-MCNC: 135 MG/DL (ref 70–99)
GLUCOSE BLD-MCNC: 144 MG/DL (ref 70–99)
GLUCOSE BLD-MCNC: 153 MG/DL (ref 70–99)
HCT VFR BLD CALC: 33.7 % (ref 36–48)
HEMOGLOBIN: 11.5 G/DL (ref 12–16)
LYMPHOCYTES ABSOLUTE: 1.2 K/UL (ref 1–5.1)
LYMPHOCYTES RELATIVE PERCENT: 10.4 %
MCH RBC QN AUTO: 34.8 PG (ref 26–34)
MCHC RBC AUTO-ENTMCNC: 34.2 G/DL (ref 31–36)
MCV RBC AUTO: 101.8 FL (ref 80–100)
MONOCYTES ABSOLUTE: 0.6 K/UL (ref 0–1.3)
MONOCYTES RELATIVE PERCENT: 4.9 %
NEUTROPHILS ABSOLUTE: 10.1 K/UL (ref 1.7–7.7)
NEUTROPHILS RELATIVE PERCENT: 84.3 %
PDW BLD-RTO: 12.4 % (ref 12.4–15.4)
PERFORMED ON: ABNORMAL
PERFORMED ON: ABNORMAL
PLATELET # BLD: 290 K/UL (ref 135–450)
PMV BLD AUTO: 7.7 FL (ref 5–10.5)
POTASSIUM SERPL-SCNC: 4.4 MMOL/L (ref 3.5–5.1)
RBC # BLD: 3.31 M/UL (ref 4–5.2)
SODIUM BLD-SCNC: 130 MMOL/L (ref 136–145)
WBC # BLD: 12 K/UL (ref 4–11)

## 2020-10-02 PROCEDURE — 96365 THER/PROPH/DIAG IV INF INIT: CPT

## 2020-10-02 PROCEDURE — APPNB45 APP NON BILLABLE 31-45 MINUTES: Performed by: NURSE PRACTITIONER

## 2020-10-02 PROCEDURE — 96375 TX/PRO/DX INJ NEW DRUG ADDON: CPT

## 2020-10-02 PROCEDURE — 97530 THERAPEUTIC ACTIVITIES: CPT

## 2020-10-02 PROCEDURE — 97116 GAIT TRAINING THERAPY: CPT

## 2020-10-02 PROCEDURE — 97110 THERAPEUTIC EXERCISES: CPT

## 2020-10-02 PROCEDURE — 2580000003 HC RX 258: Performed by: ORTHOPAEDIC SURGERY

## 2020-10-02 PROCEDURE — 6360000002 HC RX W HCPCS: Performed by: ORTHOPAEDIC SURGERY

## 2020-10-02 PROCEDURE — 6370000000 HC RX 637 (ALT 250 FOR IP): Performed by: ORTHOPAEDIC SURGERY

## 2020-10-02 PROCEDURE — 85025 COMPLETE CBC W/AUTO DIFF WBC: CPT

## 2020-10-02 PROCEDURE — 80048 BASIC METABOLIC PNL TOTAL CA: CPT

## 2020-10-02 PROCEDURE — G0378 HOSPITAL OBSERVATION PER HR: HCPCS

## 2020-10-02 PROCEDURE — 99024 POSTOP FOLLOW-UP VISIT: CPT | Performed by: NURSE PRACTITIONER

## 2020-10-02 RX ORDER — ACETAMINOPHEN 500 MG
500 TABLET ORAL 3 TIMES DAILY
Qty: 82 TABLET | Refills: 0 | Status: SHIPPED | OUTPATIENT
Start: 2020-10-02 | End: 2020-10-30

## 2020-10-02 RX ORDER — CELECOXIB 200 MG/1
200 CAPSULE ORAL 2 TIMES DAILY
Qty: 56 CAPSULE | Refills: 0 | Status: SHIPPED | OUTPATIENT
Start: 2020-10-02 | End: 2020-10-30

## 2020-10-02 RX ORDER — HYDROCODONE BITARTRATE AND ACETAMINOPHEN 7.5; 325 MG/1; MG/1
1-2 TABLET ORAL EVERY 6 HOURS PRN
Qty: 42 TABLET | Refills: 0 | Status: SHIPPED | OUTPATIENT
Start: 2020-10-02 | End: 2020-10-09

## 2020-10-02 RX ADMIN — ACETAMINOPHEN 650 MG: 325 TABLET ORAL at 00:23

## 2020-10-02 RX ADMIN — HYDROCODONE BITARTRATE AND ACETAMINOPHEN 1 TABLET: 7.5; 325 TABLET ORAL at 05:06

## 2020-10-02 RX ADMIN — METOPROLOL SUCCINATE 100 MG: 50 TABLET, EXTENDED RELEASE ORAL at 09:37

## 2020-10-02 RX ADMIN — DEXAMETHASONE SODIUM PHOSPHATE 10 MG: 4 INJECTION, SOLUTION INTRAMUSCULAR; INTRAVENOUS at 05:05

## 2020-10-02 RX ADMIN — BISACODYL 10 MG: 5 TABLET, COATED ORAL at 09:37

## 2020-10-02 RX ADMIN — CELECOXIB 200 MG: 200 CAPSULE ORAL at 09:36

## 2020-10-02 RX ADMIN — ASPIRIN 325 MG: 325 TABLET, COATED ORAL at 09:36

## 2020-10-02 RX ADMIN — HYDROCODONE BITARTRATE AND ACETAMINOPHEN 1 TABLET: 7.5; 325 TABLET ORAL at 00:24

## 2020-10-02 RX ADMIN — SODIUM CHLORIDE, POTASSIUM CHLORIDE, SODIUM LACTATE AND CALCIUM CHLORIDE: 600; 310; 30; 20 INJECTION, SOLUTION INTRAVENOUS at 05:05

## 2020-10-02 RX ADMIN — OXYCODONE HYDROCHLORIDE AND ACETAMINOPHEN 500 MG: 500 TABLET ORAL at 09:36

## 2020-10-02 RX ADMIN — LEVOTHYROXINE SODIUM 88 MCG: 0.09 TABLET ORAL at 05:06

## 2020-10-02 RX ADMIN — PANTOPRAZOLE SODIUM 40 MG: 40 TABLET, DELAYED RELEASE ORAL at 05:07

## 2020-10-02 RX ADMIN — INSULIN LISPRO 2 UNITS: 100 INJECTION, SOLUTION INTRAVENOUS; SUBCUTANEOUS at 09:50

## 2020-10-02 RX ADMIN — ACETAMINOPHEN 650 MG: 325 TABLET ORAL at 05:06

## 2020-10-02 RX ADMIN — STANDARDIZED SENNA CONCENTRATE AND DOCUSATE SODIUM 1 TABLET: 8.6; 5 TABLET ORAL at 09:37

## 2020-10-02 RX ADMIN — INSULIN LISPRO 2 UNITS: 100 INJECTION, SOLUTION INTRAVENOUS; SUBCUTANEOUS at 12:10

## 2020-10-02 RX ADMIN — CEFAZOLIN SODIUM 2 G: 10 INJECTION, POWDER, FOR SOLUTION INTRAVENOUS at 00:23

## 2020-10-02 RX ADMIN — AMLODIPINE BESYLATE 2.5 MG: 5 TABLET ORAL at 09:37

## 2020-10-02 RX ADMIN — HYDROCODONE BITARTRATE AND ACETAMINOPHEN 1 TABLET: 7.5; 325 TABLET ORAL at 09:52

## 2020-10-02 ASSESSMENT — PAIN DESCRIPTION - FREQUENCY
FREQUENCY: INTERMITTENT
FREQUENCY: INTERMITTENT

## 2020-10-02 ASSESSMENT — PAIN DESCRIPTION - LOCATION
LOCATION: KNEE
LOCATION: KNEE

## 2020-10-02 ASSESSMENT — PAIN SCALES - GENERAL
PAINLEVEL_OUTOF10: 2
PAINLEVEL_OUTOF10: 4
PAINLEVEL_OUTOF10: 6
PAINLEVEL_OUTOF10: 6
PAINLEVEL_OUTOF10: 8
PAINLEVEL_OUTOF10: 1
PAINLEVEL_OUTOF10: 7

## 2020-10-02 ASSESSMENT — PAIN DESCRIPTION - ONSET
ONSET: ON-GOING
ONSET: ON-GOING

## 2020-10-02 ASSESSMENT — PAIN DESCRIPTION - DESCRIPTORS
DESCRIPTORS: ACHING;DISCOMFORT
DESCRIPTORS: ACHING;DISCOMFORT

## 2020-10-02 ASSESSMENT — PAIN DESCRIPTION - PAIN TYPE
TYPE: SURGICAL PAIN;ACUTE PAIN
TYPE: SURGICAL PAIN

## 2020-10-02 ASSESSMENT — PAIN DESCRIPTION - ORIENTATION
ORIENTATION: LEFT
ORIENTATION: LEFT

## 2020-10-02 NOTE — PROGRESS NOTES
Physical Therapy  Facility/Department: [address] ORTHO/NEURO NURSING  Daily Treatment Note  NAME: Jose Kent  : 1960  MRN: [id number]    Date of Service: 10/2/2020    Discharge Recommendations:  Jose Kent scored a 22/24 on the AM-PAC short mobility form. Current research shows that an AM-PAC score of 18 or greater is typically associated with a discharge to the patient's home setting. Based on the patient's AM-PAC score and their current functional mobility deficits, it is recommended that the patient have 2-3 sessions per week of Physical Therapy at d/c to increase the patient's independence. At this time, this patient demonstrates the endurance and safety to discharge home with home health PT and a follow up treatment frequency of 2-3x/wk. Please see assessment section for further patient specific details. If patient discharges prior to next session this note will serve as a discharge summary. Please see below for the latest assessment towards goals. 2-3 sessions per week, Home with assist PRN   PT Equipment Recommendations  Equipment Needed: No  Other: Patient states she has walker at home. Assessment   Body structures, Functions, Activity limitations: Decreased functional mobility ; Decreased ADL status; Decreased strength;Decreased balance; Increased pain;Decreased ROM  Assessment: Patient continues to improve functional independence and strength/ROM L knee post L TKA. She is safe for d/c home. Recommend ongoing therapy to address deficits. Treatment Diagnosis: decreased functional mobility, ROM, strength  Prognosis: Good  Decision Making: Low Complexity  History: HTN, PONV, OCD, IBS, Celiac disease  Clinical Presentation: stable  PT Education: Goals;PT Role;Plan of Care;Home Exercise Program;Transfer Training;General Safety; Family Education; Adaptive Device Training;Gait Training;Functional Mobility Training;Weight-bearing Education  Patient Education: Spouse and patient verbalized understanding. Barriers to Learning: none  REQUIRES PT FOLLOW UP: Yes  Activity Tolerance  Activity Tolerance: Patient Tolerated treatment well  Activity Tolerance: No apparent limitations. Patient Diagnosis(es): The primary encounter diagnosis was Preop testing. A diagnosis of S/P total knee arthroplasty, left was also pertinent to this visit. has a past medical history of Bilateral leg edema, Celiac disease, Hypertension, Hypothyroidism, IBS (irritable bowel syndrome), Indigestion, OCD (obsessive compulsive disorder), and PONV (postoperative nausea and vomiting). has a past surgical history that includes lumbar discectomy (2013); Hand surgery (Left, 2/2015); Rotator cuff repair (Right); Ventricular ablation surgery; Endometrial ablation; and Total knee arthroplasty (Left, 10/1/2020). Restrictions  Restrictions/Precautions  Restrictions/Precautions: Weight Bearing, Fall Risk  Lower Extremity Weight Bearing Restrictions  Right Lower Extremity Weight Bearing: Weight Bearing As Tolerated  Left Lower Extremity Weight Bearing: Weight Bearing As Tolerated  Position Activity Restriction  Other position/activity restrictions: s/p L TKA 10/1/2020     Subjective   General  Chart Reviewed: Yes  Additional Pertinent Hx: HTN, PONV, OCD, IBS, Celiac disease  Family / Caregiver Present: Yes  Subjective  Subjective: Patient supine in bed, states she plans on d/c home today.   Pain Screening  Patient Currently in Pain: Yes  Pain Assessment  Pain Level: 6  Patient's Stated Pain Goal: No pain  Pain Type: Surgical pain;Acute pain  Pain Location: Knee  Pain Orientation: Left  Pain Descriptors: Aching;Discomfort  Pain Frequency: Intermittent  Pain Onset: On-going  Response to Pain Intervention: Patient Satisfied  Vital Signs  Patient Currently in Pain: Yes       Orientation  Orientation  Overall Orientation Status: Within Normal Limits     Objective   Bed mobility  Supine to Sit: Modified independent  Scooting: transfers with LRAD mod I.  (Not met)  Short term goal 3: Pt will ambulate 50ft with LRAD mod I.  (Not met)  Short term goal 4: Pt will ascend/descend one flight of stairs with HR SBA. (Not met)  Short term goal 5: Pt will demonstrate 90 degs of L knee flexion AROM.  (Not met)  Short term goal 6: Pt will perform car transfer with LRAD and SBA.  (Goal met 10/2/2020)  Long term goals  Time Frame for Long term goals : LTG = STG  Patient Goals   Patient goals : walk without pain    Plan    Plan  Times per week: 7x (BID)  Times per day: Twice a day  Current Treatment Recommendations: Strengthening, ROM, Balance Training, Functional Mobility Training, Transfer Training, Endurance Training, ADL/Self-care Training, Gait Training, Stair training, Neuromuscular Re-education, Manual Therapy - Soft Tissue Mobilization, Safety Education & Training, Home Exercise Program, Pain Management, Manual Therapy - Joint Manipulation, Patient/Caregiver Education & Training, Modalities, Positioning  Safety Devices  Type of devices:  All fall risk precautions in place, Call light within reach, Gait belt, Nurse notified, Left in chair  Restraints  Initially in place: No     Therapy Time   Individual Concurrent Group Co-treatment   Time In 1011         Time Out 1105         Minutes 54         Timed Code Treatment Minutes: [facility], [address], [place], ATC-R [id number]

## 2020-10-02 NOTE — PLAN OF CARE
Problem: Discharge Planning:  Goal: Discharged to appropriate level of care  Description: Discharged to appropriate level of care  10/2/2020 1225 by Aiden Jones RN  Outcome: Completed  10/1/2020 2305 by Juvenal Colón RN  Outcome: Ongoing     Problem: Mobility - Impaired:  Goal: Mobility will improve  Description: Mobility will improve  10/2/2020 1225 by Aiden Jones RN  Outcome: Completed  10/1/2020 2305 by Juvenal Colón RN  Outcome: Ongoing     Problem: Infection - Surgical Site:  Goal: Will show no infection signs and symptoms  Description: Will show no infection signs and symptoms  10/2/2020 1225 by Aiden Jones RN  Outcome: Completed  10/1/2020 2305 by Juvenal Colón RN  Outcome: Ongoing     Problem: Pain - Acute:  Goal: Pain level will decrease  Description: Pain level will decrease  10/2/2020 1225 by Aiden Jones RN  Outcome: Completed  10/1/2020 2305 by Juvenal Colón RN  Outcome: Ongoing     Problem: Falls - Risk of:  Goal: Will remain free from falls  Description: Will remain free from falls  10/2/2020 1225 by Aiden Jones RN  Outcome: Completed  10/1/2020 2305 by Juvenal Colón RN  Outcome: Ongoing  Goal: Absence of physical injury  Description: Absence of physical injury  10/2/2020 1225 by Aiden Jones RN  Outcome: Completed  10/1/2020 2305 by Juvenal Colón RN  Outcome: Ongoing     Problem: Pain:  Goal: Pain level will decrease  Description: Pain level will decrease  10/2/2020 1225 by Aiden Jones RN  Outcome: Completed  10/1/2020 2305 by Juvenal Colón RN  Outcome: Ongoing  Goal: Control of acute pain  Description: Control of acute pain  10/2/2020 1225 by Aiden Jones RN  Outcome: Completed  10/1/2020 2305 by Juvenal Colón RN  Outcome: Ongoing  Goal: Control of chronic pain  Description: Control of chronic pain  10/2/2020 1225 by Aiden Jones RN  Outcome: Completed  10/1/2020 2305 by Juvenal Colón RN  Outcome: Ongoing     Problem: Musculor/Skeletal Functional Status  Goal: Highest potential functional level  10/2/2020 1225 by Bassem Smith RN  Outcome: Completed  10/1/2020 2305 by Isabelle Baca RN  Outcome: Ongoing  Goal: Absence of falls  10/2/2020 1225 by Bassem Smith RN  Outcome: Completed  10/1/2020 2305 by Isabelle Baca RN  Outcome: Ongoing     Problem: Skin Integrity:  Goal: Will show no infection signs and symptoms  Description: Will show no infection signs and symptoms  10/2/2020 1225 by Bassem Smith RN  Outcome: Completed  10/1/2020 2305 by Isabelle Baca RN  Outcome: Ongoing  Goal: Absence of new skin breakdown  Description: Absence of new skin breakdown  10/2/2020 1225 by Bassem Smith RN  Outcome: Completed  10/1/2020 2305 by Isabelle Baca RN  Outcome: Ongoing

## 2020-10-02 NOTE — DISCHARGE INSTR - COC
Maintain Prineo dressing (glued clear dressing over incision);  keep in place until your post op visit with Dr. Abdon Mcintyre .  -  Keep incision clean at all times. Keep pets away from your incision. May shower; no baths. -  No driving for at least 2 weeks; no driving while on narcotic pain medication.  -  Prevent constipation while taking narcotics by drinking plenty of water, using laxative or stool softeners as needed. -  Continue use of cooling pad/ice pack as needed for pain. -  Wear compression stockings during the day until your post op office visit. - Tylenol 500mg three times daily  - Norco as needed for pain (no more than 8 tabs/day)  - Celebrex 200mg twice daily     DVT prophylaxis:   mg. Twice daily x 14 days to begin day after DC from [facility].    Follow-Up with Dr. Dandre Jiménez:  Call to schedule two-week follow up appointment:  [phone])  Future Appointments   Date Time Provider Santhosh Reynoldsi   10/16/2020  1:30 PM Dandre Jiménez MD [facility] ANTHONY RAHMAN [facility]       May discard excess narcotic medications at the following facilities:    [facility]. [facility] [employer]. [address] [place] [address]. [place] [id number]   [address] [place] [address]      Patient's personal belongings (please select all that are sent with patient):  Hammad Hooper RN SIGNATURE:  Electronically signed by Jack Linton RN on 10/2/20 at 11:13 AM EDT    CASE MANAGEMENT/SOCIAL WORK SECTION    Inpatient Status Date: 10/1/2020    Readmission Risk Assessment Score:  Readmission Risk              Risk of Unplanned Readmission:        8           Discharging to Facility/ Agency   · [address]  · [address] [address] [address]  · Phone: [phone]  · Fax: [phone]      Dialysis Facility (if applicable)   · Name:  · Address:  · Dialysis Schedule:  · Phone:  · Fax:    / signature: YASMANY McqueenN, RN  [phone]    PHYSICIAN SECTION    Prognosis: Good    Condition at Discharge: Stable    Rehab Potential (if transferring to Rehab): Good    Recommended Labs or Other Treatments After Discharge: ***    Physician Certification: I certify the above information and transfer of Mechelle Whyte  is necessary for the continuing treatment of the diagnosis listed and that she requires [address] for less 30 days.      Update Admission H&P: Changes in H&P as follows - S/p LEFT total knee arthroplasty    PHYSICIAN SIGNATURE:  Electronically signed by ASHELY Lopez CNP on 10/2/20 at 9:33 AM EDT

## 2020-10-02 NOTE — PROGRESS NOTES
10:05 AM  AM assessment complete. VSS. Reflexes moderate bilaterally. Pedal pulses palpable. +1 edema in LLE. Pt ambulating in room well. Pain 6/10. Rodrigo spain, Brenda Chauhan NP, approved of exceeding 4000mg acetaminophen limit. Call light within reach. Pt demonstrated how to use properly. Will continue to monitor. Denies further needs. The care plan and education has been reviewed and mutually agreed upon with the patient. 12:24 PM   Discharge instructions reviewed with patient. Pt alert and oriented x4. Verbalized understanding of what to expect at home and any changes made to medications. Scripts sent to pharmacy/delivered. Given the opportunity to answer all questions. Pt has follow up appointment with Dr. Juliano Butler in 2 weeks. Verbalized understanding if any complications arise to call doctor or come to ED if emergent. Pt denies any further needs prior to discharge. Pt to be discharged home, belongings packed up and IV removed. Waiting for home care to be confirmed. 12:47 PM  Pt discharged to home with all belongings. Wheeled off floor by nurse.

## 2020-10-02 NOTE — DISCHARGE SUMMARY
Work    Significant Diagnostic Studies:   Stable post-op films. Discharge Exam:  BP (!) 158/94   Pulse 83   Temp 98.8 °F (37.1 °C) (Oral)   Resp 16   Ht 5' 6\" (1.676 m)   Wt 199 lb 4 oz (90.4 kg)   SpO2 94%   BMI 32.16 kg/m²     Gen - NAD, AOx3   - voiding spontaneously  Ext - ROM 0-80 degrees  Operative leg NVI  Discharge condition:  Stable    Discharge Medications:  ALL MEDICATIONS HAVE BEEN REVIEWED:  Discharge Medication List as of 10/2/2020 11:30 AM      START taking these medications    Details   aspirin 325 MG EC tablet Take 1 tablet by mouth 2 times daily, Disp-28 tablet,R-0Print      celecoxib (CELEBREX) 200 MG capsule Take 1 capsule by mouth 2 times daily for 28 days, Disp-56 capsule,R-0Print      HYDROcodone-acetaminophen (NORCO) 7.5-325 MG per tablet Take 1-2 tablets by mouth every 6 hours as needed for Pain for up to 7 days. , Disp-42 tablet,R-0Print      acetaminophen (TYLENOL) 500 MG tablet Take 1 tablet by mouth 3 times daily for 28 days, Disp-82 tablet,R-0Print         CONTINUE these medications which have NOT CHANGED    Details   felodipine (PLENDIL) 2.5 MG extended release tablet Take 2.5 mg by mouth dailyHistorical Med      losartan (COZAAR) 100 MG tablet Historical Med      Probiotic Product (PROBIOTIC DAILY PO) Take by mouth      metoprolol (TOPROL-XL) 25 MG XL tablet Take 100 mg by mouth 2 times daily TAKES 1/2 TAB TWICE A DAYHistorical Med      Bisacodyl (DULCOLAX PO) Take 10 mg by mouth daily Historical Med      levothyroxine (SYNTHROID) 100 MCG tablet Take 88 mcg by mouth Daily. clomiPRAMINE (ANAFRANIL) 75 MG capsule Take 75 mg by mouth nightly. omeprazole (PRILOSEC) 10 MG capsule Take 20 mg by mouth daily Historical Med      furosemide (LASIX) 20 MG tablet Take 20 mg by mouth Twice a Week prnHistorical Med      BIOTIN PO Take 3,000 mcg by mouth daily. vitamin B-1 (THIAMINE) 100 MG tablet Take 100 mg by mouth daily.       calcium citrate (CALCITRATE) 950 MG tablet Take 1 tablet by mouth daily. Ascorbic Acid (VITAMIN C) 500 MG tablet Take 500 mg by mouth daily. STOP taking these medications       mupirocin (BACTROBAN) 2 % ointment Comments:   Reason for Stopping:             Due to patient's orthopaedic surgical procedure/condition, patient may require pain medication for up to 6-8 weeks. Disposition: home    Patient Instructions:    Activity: activity as tolerated and no driving for 2 weeks  Wound Care: keep wound clean and dry  Anticoagulation:  ASA 325mg BID x 14 days    Follow-Up:  Future Appointments   Date Time Provider Santhosh Mcpherson   10/16/2020  1:30 PM MD DANIELITO Bonner, APRN-CNP  10/2/2020  1:00 PM    CC: PCP

## 2020-10-02 NOTE — CARE COORDINATION
[address] has accepted the patient. [facility] order, AVS/WINNIE and discharge packet faxed to agency. Patient discharged 10/2/2020 to home with [address] All discharge needs met per case management.     YASMANY HolcombN, RN  [phone]

## 2020-10-02 NOTE — PROGRESS NOTES
2015: Head to toe completed at this time. Dressing/ soft cast remains in place to LLE at this time, pedal pulses present, color and circulation appear WNL. Denies SOB. PRN Norco administered for pain to LLE, ice applied to site. Patient ambulating well around room, voided 350 at start of shift. Plan of care reviewed and agreed upon at this time. Fall precautions remain in place and effective. All needs addressed at this time, VSS, bed in lowest position with call light in reach.

## 2020-10-02 NOTE — PROGRESS NOTES
Occupational Therapy  Facility/Department: [address] ORTHO/NEURO NURSING  Daily Treatment Note  NAME: Gonsalo Brooks  : 1960  MRN: [id number]    Date of Service: 10/2/2020    Discharge Recommendations:  Gonsalo Brooks scored a  on the -PAC ADL Inpatient form. At this time, no further OT is recommended upon discharge due to pt expected to be at baseline level of occupational function at discharge. Recommend patient returns to prior setting. OT Equipment Recommendations  Equipment Needed: No    Assessment   Performance deficits / Impairments: Decreased functional mobility ; Decreased balance;Decreased high-level IADLs;Decreased strength  Assessment: pt not at baseline and would benefit from ongoing skilled OT services in order to return to PLOF  Treatment Diagnosis: R TKA, pt presents with the above stated deficits  Prognosis: Good  History: pt indpendent at baseline  Assistance / Modification: CGA to SBA  OT Education: ADL Adaptive Strategies;OT Role;Plan of Care;Transfer Training;Family Education;IADL Safety  Patient Education: discharge; walker safety education; pt indepenently verbalized understanding  Barriers to Learning: none  REQUIRES OT FOLLOW UP: Yes  Activity Tolerance  Activity Tolerance: Patient Tolerated treatment well         Patient Diagnosis(es): The primary encounter diagnosis was Preop testing. A diagnosis of S/P total knee arthroplasty, left was also pertinent to this visit. has a past medical history of Bilateral leg edema, Celiac disease, Hypertension, Hypothyroidism, IBS (irritable bowel syndrome), Indigestion, OCD (obsessive compulsive disorder), and PONV (postoperative nausea and vomiting). has a past surgical history that includes lumbar discectomy (); Hand surgery (Left, 2015); Rotator cuff repair (Right); Ventricular ablation surgery;  Endometrial ablation; and Total knee arthroplasty (Left, 10/1/2020). Restrictions  Restrictions/Precautions  Restrictions/Precautions: Weight Bearing, Fall Risk  Lower Extremity Weight Bearing Restrictions  Right Lower Extremity Weight Bearing: Weight Bearing As Tolerated  Left Lower Extremity Weight Bearing: Weight Bearing As Tolerated  Position Activity Restriction  Other position/activity restrictions: s/p L TKA 10/1/2020  Subjective   General  Chart Reviewed: Yes  Patient assessed for rehabilitation services?: Yes  Response to previous treatment: Patient with no complaints from previous session  Family / Caregiver Present: Keri Rocha)  Diagnosis: s/p L TKA 10/1/20  Subjective  Subjective: Pt found seated in chair upon arrival; pleasant and agreeable to OT eval. Pt denies pain , states her pain is about 4/10 when moving. RN notified  Pain Assessment  Pain Assessment: 0-10  Pain Level: 4  Pre Treatment Pain Screening  Intervention List: Patient able to continue with treatment;Nurse/Physician notified  Vital Signs  Patient Currently in Pain: Yes   Orientation  Orientation  Overall Orientation Status: Within Normal Limits  Objective    ADL  LE Dressing: Minimal assistance(Supervision for pants; Mod A for socks)  Additional Comments: declined futher ADLs        Balance  Sitting Balance: Independent  Standing Balance: Supervision  Standing Balance  Time: ~2 min, ~1 min, ~2 min, ~1 min  Activity: functional mobility to toilet for toilet transfer and functional mobility to w/c, functional mobility for tub transfer, static standing for tub transfer demo and functional mobility to w/c, functional mobility to EOB from w/c  Comment: w/RW  Functional Mobility  Functional - Mobility Device: Rolling Walker  Activity: To/from bathroom  Assist Level: Supervision  Toilet Transfers  Toilet - Technique: Ambulating; To right  Equipment Used: Standard toilet  Toilet Transfer: Supervision  Tub Transfers  Tub - Transfer From: Rolling walker  Tub - Transfer Type:  To and From  Tub -

## 2020-10-02 NOTE — PLAN OF CARE
Problem: Discharge Planning:  Goal: Discharged to appropriate level of care  Description: Discharged to appropriate level of care  10/1/2020 2305 by Tyree Kwok RN  Outcome: Ongoing  10/1/2020 1249 by María Elena Hutchins RN  Outcome: Ongoing  10/1/2020 1145 by Viktoriya Matias RN  Outcome: Ongoing     Problem: Mobility - Impaired:  Goal: Mobility will improve  Description: Mobility will improve  10/1/2020 2305 by Tyree Kwok RN  Outcome: Ongoing  10/1/2020 1249 by María Elena Hutchins RN  Outcome: Ongoing  10/1/2020 1145 by Viktoriya Matias RN  Outcome: Ongoing     Problem: Infection - Surgical Site:  Goal: Will show no infection signs and symptoms  Description: Will show no infection signs and symptoms  10/1/2020 2305 by Tyree Kwok RN  Outcome: Ongoing  10/1/2020 1249 by María Elena Hutchins RN  Outcome: Ongoing  10/1/2020 1145 by Viktoriya Matias RN  Outcome: Ongoing     Problem: Pain - Acute:  Goal: Pain level will decrease  Description: Pain level will decrease  10/1/2020 2305 by Tyree Kwok RN  Outcome: Ongoing  10/1/2020 1249 by María Elena Hutchins RN  Outcome: Ongoing  10/1/2020 1145 by Viktoriya Matias RN  Outcome: Ongoing     Problem: Falls - Risk of:  Goal: Will remain free from falls  Description: Will remain free from falls  10/1/2020 2305 by Tyree Kwok RN  Outcome: Ongoing  10/1/2020 1249 by María Elena Hutchins RN  Outcome: Ongoing  10/1/2020 1145 by Viktoriya Matias RN  Outcome: Ongoing  Goal: Absence of physical injury  Description: Absence of physical injury  10/1/2020 2305 by Tyree Kwok RN  Outcome: Ongoing  10/1/2020 1249 by María Elena Hutchins RN  Outcome: Ongoing  10/1/2020 1145 by Viktoriya Matias RN  Outcome: Ongoing     Problem: Pain:  Goal: Pain level will decrease  Description: Pain level will decrease  10/1/2020 2305 by Tyree Kwok RN  Outcome: Ongoing  10/1/2020 1249 by María Elena Hutchins RN  Outcome: Ongoing  10/1/2020 1145 by Viktoriya Matias RN  Outcome: Ongoing  Goal: Control of acute pain  Description: Control of acute pain  10/1/2020 2305 by Madeline Salazar RN  Outcome: Ongoing  10/1/2020 1249 by Sukhjinder Corbett RN  Outcome: Ongoing  10/1/2020 1145 by Felicity Taylor RN  Outcome: Ongoing  Goal: Control of chronic pain  Description: Control of chronic pain  10/1/2020 2305 by Madeline Salazar RN  Outcome: Ongoing  10/1/2020 1249 by Sukhjinder Corbett RN  Outcome: Ongoing  10/1/2020 1145 by Felicity Taylor RN  Outcome: Ongoing     Problem: Musculor/Skeletal Functional Status  Goal: Highest potential functional level  Outcome: Ongoing  Goal: Absence of falls  Outcome: Ongoing     Problem: Skin Integrity:  Goal: Will show no infection signs and symptoms  Description: Will show no infection signs and symptoms  Outcome: Ongoing  Goal: Absence of new skin breakdown  Description: Absence of new skin breakdown  Outcome: Ongoing

## 2020-10-02 NOTE — CARE COORDINATION
[facility] Referral sent to [facility] per [facility] recommendation. Voicemail to intake, will await return call.      Amber Carolina, YASMANYN, RN  [phone]

## 2020-10-02 NOTE — PROGRESS NOTES
Consult Progress Note - Dr. Kip Sanchez - Internal Medicine    Referring MD: Mimi Davidson MD  PCP: [facility] / [place] [id number] [address] [facility],First Floor Day: 1  Code Status: Full Code  Current Diet: DIET GENERAL;    CC: follow up on medical issues    Subjective:   Rafael Barroso is a 64 y.o. female. Doing ok  Pain controlled  Fitful sleep due to pain but it is better now  No other issues  Has not yet worked with therapy today    Pain is controlled. Doing well with therapy. Patient is tolerating diet. Pt denies chest pain, denies shortness of breath, denies nausea,  denies emesis. I have reviewed the patient's medical and social history in detail and updated the computerized patient record.      Active Hospital Problems    Diagnosis Date Noted    Acute blood loss as cause of postoperative anemia [D62] 10/02/2020    Primary osteoarthritis of left knee [M17.12] 10/01/2020    HTN (hypertension) [I10] 10/01/2020    Hypothyroid [E03.9] 10/01/2020    GERD (gastroesophageal reflux disease) [K21.9] 10/01/2020       Current Facility-Administered Medications: vitamin C (ASCORBIC ACID) tablet 500 mg, 500 mg, Oral, Daily  bisacodyl (DULCOLAX) EC tablet 10 mg, 10 mg, Oral, Daily  clomiPRAMINE (ANAFRANIL) capsule 75 mg (Patient Supplied), 75 mg, Oral, Nightly  amLODIPine (NORVASC) tablet 2.5 mg, 2.5 mg, Oral, Daily  furosemide (LASIX) tablet 20 mg, 20 mg, Oral, Once per day on Mon Thu  levothyroxine (SYNTHROID) tablet 88 mcg, 88 mcg, Oral, Daily  metoprolol succinate (TOPROL XL) extended release tablet 100 mg, 100 mg, Oral, BID  pantoprazole (PROTONIX) tablet 40 mg, 40 mg, Oral, QAM AC  sodium chloride flush 0.9 % injection 10 mL, 10 mL, Intravenous, 2 times per day  sodium chloride flush 0.9 % injection 10 mL, 10 mL, Intravenous, PRN  acetaminophen (TYLENOL) tablet 650 mg, 650 mg, Oral, Q6H  sennosides-docusate sodium (SENOKOT-S) 8.6-50 MG tablet 1 tablet, 1 tablet, Oral, BID  magnesium hydroxide (MILK OF MAGNESIA) 400 MG/5ML suspension 30 mL, 30 mL, Oral, Daily PRN  insulin lispro (1 Unit Dial) 0-12 Units, 0-12 Units, Subcutaneous, TID WC  insulin lispro (1 Unit Dial) 0-6 Units, 0-6 Units, Subcutaneous, Nightly  glucose (GLUTOSE) 40 % oral gel 15 g, 15 g, Oral, PRN  dextrose 50 % IV solution, 12.5 g, Intravenous, PRN  glucagon (rDNA) injection 1 mg, 1 mg, Intramuscular, PRN  dextrose 5 % solution, 100 mL/hr, Intravenous, PRN  lactated ringers infusion, , Intravenous, Continuous  HYDROmorphone HCl PF (DILAUDID) injection 0.25 mg, 0.25 mg, Intravenous, Q3H PRN **OR** HYDROmorphone HCl PF (DILAUDID) injection 0.5 mg, 0.5 mg, Intravenous, Q3H PRN  promethazine (PHENERGAN) tablet 12.5 mg, 12.5 mg, Oral, Q6H PRN **OR** ondansetron (ZOFRAN) injection 4 mg, 4 mg, Intravenous, Q6H PRN  aspirin EC tablet 325 mg, 325 mg, Oral, BID  HYDROcodone-acetaminophen (NORCO) 7.5-325 MG per tablet 1 tablet, 1 tablet, Oral, Q4H PRN  celecoxib (CELEBREX) capsule 200 mg, 200 mg, Oral, BID  0.9 % sodium chloride bolus, 500 mL, Intravenous, PRN  potassium chloride (KLOR-CON M) extended release tablet 40 mEq, 40 mEq, Oral, PRN **OR** potassium bicarb-citric acid (EFFER-K) effervescent tablet 40 mEq, 40 mEq, Oral, PRN **OR** potassium chloride 10 mEq/100 mL IVPB (Peripheral Line), 10 mEq, Intravenous, PRN  losartan (COZAAR) tablet 100 mg, 100 mg, Oral, Daily     Objective:      /75   Pulse 88   Temp 97.8 °F (36.6 °C) (Oral)   Resp 16   Ht 5' 6\" (1.676 m)   Wt 199 lb 4 oz (90.4 kg)   SpO2 93%   BMI 32.16 kg/m²      Patient Vitals for the past 24 hrs:   BP Temp Temp src Pulse Resp SpO2   10/02/20 0419 137/75 97.8 °F (36.6 °C) Oral 88 16 93 %   10/02/20 0015 (!) 165/85 98.5 °F (36.9 °C) Oral 101 16 98 %   10/01/20 2015 139/78 97.8 °F (36.6 °C) Oral 100 18 92 %   10/01/20 1713 (!) 151/92 98 °F (36.7 °C) Oral 91 18 93 %   10/01/20 1613 -- -- -- -- -- 95 %   10/01/20 1435 -- -- -- -- -- 95 %   10/01/20 1313 127/82 98.4 °F (36.9 °C) Oral 86 16 96 %   10/01/20 1134 129/71 97.9 °F (36.6 °C) Oral 97 16 97 %   10/01/20 1104 (!) 142/83 98.2 °F (36.8 °C) Oral 93 14 96 %   10/01/20 1034 131/87 98.9 °F (37.2 °C) Oral 99 14 95 %   10/01/20 1015 130/75 98.3 °F (36.8 °C) Temporal 99 14 93 %   10/01/20 1010 138/81 -- -- 98 -- 92 %   10/01/20 1000 137/81 -- -- 103 -- 94 %   10/01/20 0955 139/87 -- -- 102 -- 94 %   10/01/20 0950 (!) 147/87 -- -- 104 -- 96 %   10/01/20 0945 (!) 153/99 -- -- 107 -- 93 %   10/01/20 0944 (!) 153/99 98.3 °F (36.8 °C) Temporal 107 12 94 %     Patient Vitals for the past 96 hrs (Last 3 readings):   Weight   10/01/20 0616 199 lb 4 oz (90.4 kg)         Intake/Output Summary (Last 24 hours) at 10/2/2020 0919  Last data filed at 10/2/2020 0710  Gross per 24 hour   Intake 3466 ml   Output 2300 ml   Net 1166 ml         Physical Exam:  /75   Pulse 88   Temp 97.8 °F (36.6 °C) (Oral)   Resp 16   Ht 5' 6\" (1.676 m)   Wt 199 lb 4 oz (90.4 kg)   SpO2 93%   BMI 32.16 kg/m²   General appearance: alert, appears stated age and cooperative  Head: Normocephalic, without obvious abnormality, atraumatic  Lungs: clear to auscultation bilaterally  Heart: regular rate and rhythm, S1, S2 normal, no murmur, click, rub or gallop  Abdomen: soft, non-tender; bowel sounds normal; no masses,  no organomegaly  Extremities: dressing CDI    Labs:  Lab Results   Component Value Date    WBC 12.0 (H) 10/02/2020    HGB 11.5 (L) 10/02/2020    HCT 33.7 (L) 10/02/2020     10/02/2020     (L) 10/02/2020    K 4.4 10/02/2020    CL 94 (L) 10/02/2020    CREATININE 0.5 (L) 10/02/2020    BUN 7 10/02/2020    CO2 25 10/02/2020    INR 0.92 09/25/2020    LABMICR YES 09/25/2020     No results found for: CKTOTAL, CKMB, CKMBINDEX, TROPONINI    Imaging: All recent imaging studies reviewed in computerized chart.   Xr Knee Left (1-2 Views)    Result Date: 10/1/2020  EXAMINATION: 2 X-RAY VIEWS OF THE LEFT KNEE 10/1/2020 9:54 am COMPARISON: Prior studies most recent 07/17/2020. HISTORY: ORDERING SYSTEM PROVIDED HISTORY: s/p left total knee TECHNOLOGIST PROVIDED HISTORY: Of operative side while in recovery room. Reason for exam:->s/p left total knee Reason for Exam: s/p left total knee Acuity: Unknown Type of Exam: Subsequent/Follow-up FINDINGS: Perioperative examination of the left knee has been obtained in this patient status post left knee arthroplasty. There is a 3 mm gap between the superior margin of the anterior aspect of the femoral component relative to adjacent femoral cortex. Focal lucency within the proximal tibial diaphysis may represent tract associated with orthopedic device utilized for the procedure which has subsequently been removed. Again noted is cortical thickening along the proximal fibular diaphysis which is unchanged. Finding could be developmental versus changes of prior trauma. Air identified about the knee/distal thigh related to the procedure. Status post left knee arthroplasty. Assessment and Plan:  Principal Problem:    Primary osteoarthritis of left knee -Established problem. Improving. Doing well. Pain OK this am  Plan: stay on post op pathway. To work with pt/ot today. Transition to oral pain meds. Cont to follow h/h to assess post op anemia. Active Problems:    HTN (hypertension) -Established problem. Stable. 137/75  Plan: cont on home rx    Hypothyroid -Established problem. Stable. Denies any issues. Plan: stay on synthroid    GERD (gastroesophageal reflux disease)  Plan: Continue present orders/plan. Acute blood loss as cause of postoperative anemia -New Problem to me.  hgb 11.5  Plan: No indication for transfusion. Cont to monitor h/h to assess progression of anemia. Recommend ferrous sulfate or MVI as outpatient.      Disp - medically stable          Debra Bye  10/2/2020

## 2020-10-05 ENCOUNTER — TELEPHONE (OUTPATIENT)
Dept: INPATIENT UNIT | Age: 60
End: 2020-10-05

## 2020-10-05 NOTE — TELEPHONE ENCOUNTER
Attempted to contact patient. Left voicemail for patient stating purpose and call back number.    Courtney Sue  Orthopedic Nurse Navigator  Phone number: [phone]  Future Appointments   Date Time Provider Santhosh Mcpherson   10/16/2020  1:30 PM Serenity Jolley MD [facility] ANTHONY RAHMAN [facility]

## 2020-10-05 NOTE — TELEPHONE ENCOUNTER
Spoke with Patient regarding post discharge from hospital.    Incision status: nodrainage, odor, or redness noted per patient    Edema/Swelling:  :mild    Wearing Teds: yes    Pain level and status: severe pain Saturday, doing better now     Use of pain medications: oxycodone ; Patient stated they are taking their pain medication as prescribed. Use of ice therapy: Yes     Blood thinner: ASPIRIN ; Verified with patient that they are taking their anticoagulant as prescribed 2 a day. Bowels: No: taking laxatives     Home Care Agency active: yes; [facility]  [facility] out this weekend, is coming today   . Outpatient therapy: N/A    Do you have all of your medications: Yes    Changes in medications: no      No other questions/concerns at this time. Follow up appointment confirmed. Encouraged patient to call Orthopedic Nurse Julianne Jaime (#[phone]) or Orthopedic office if has any questions/concerns.        Follow up appointments:    Future Appointments   Date Time Provider Santhosh Mcpherson   10/16/2020  1:30 PM MD DANIELITO Larson

## 2020-10-16 ENCOUNTER — OFFICE VISIT (OUTPATIENT)
Dept: ORTHOPEDIC SURGERY | Age: 60
End: 2020-10-16

## 2020-10-16 PROCEDURE — 99024 POSTOP FOLLOW-UP VISIT: CPT | Performed by: ORTHOPAEDIC SURGERY

## 2020-10-16 NOTE — PROGRESS NOTES
Chief Complaint    Post-Op Check (left TKR)      History of Present Illness:  Crystal Mata is a 64 y.o. female. Follow-up for the left knee. Status post left total knee arthroplasty. 2 weeks postoperative. Overall doing very well with the left knee. Medical History:  Patient's medications, allergies, past medical, surgical, social and family histories were reviewed and updated as appropriate. Review of Systems:  Pertinent items are noted in HPI  Review of systems reviewed from Patient History Form dated on 10/16/20 and available in the patient's chart under the Media tab. Vital Signs: There were no vitals taken for this visit. General Exam:   Constitutional: Patient is adequately groomed with no evidence of malnutrition  DTRs: Deep tendon reflexes are intact  Mental Status: The patient is oriented to time, place and person. The patient's mood and affect are appropriate. Knee Examination:    Inspection: Surgical incision well-healed over the anterior aspect the left knee    Palpation: Mild palpable effusion within the left knee today    Range of Motion: Extension left knee 0 degrees with knee flexion today to 110 degrees    Strength: She is able to do a straight leg raise    Special Tests: Negative Homans' sign. No instability to varus valgus stress    Skin: There are no rashes, ulcerations or lesions. Gait: Normal with a cane      Additional Comments:       Additional Examinations:         Right Lower Extremity: Examination of the right lower extremity does not show any tenderness, deformity or injury. Range of motion is unremarkable. There is no gross instability. There are no rashes, ulcerations or lesions. Strength and tone are normal.    Radiology:     X-rays obtained and reviewed in office:  Views 3 views left knee demonstrate surgical hardware intact in good position.   No evidence of fracture dislocation         Assessment : Status post left total knee

## 2020-10-19 RX ORDER — HYDROCODONE BITARTRATE AND ACETAMINOPHEN 5; 325 MG/1; MG/1
1 TABLET ORAL EVERY 6 HOURS PRN
Qty: 28 TABLET | Refills: 0 | Status: SHIPPED | OUTPATIENT
Start: 2020-10-19 | End: 2020-10-26

## 2020-10-21 RX ORDER — HYDROCODONE BITARTRATE AND ACETAMINOPHEN 5; 325 MG/1; MG/1
1 TABLET ORAL EVERY 6 HOURS PRN
Qty: 28 TABLET | Refills: 0 | Status: SHIPPED | OUTPATIENT
Start: 2020-10-21 | End: 2020-10-28

## 2020-11-13 ENCOUNTER — TELEPHONE (OUTPATIENT)
Dept: ORTHOPEDIC SURGERY | Age: 60
End: 2020-11-13

## 2020-11-13 ENCOUNTER — VIRTUAL VISIT (OUTPATIENT)
Dept: ORTHOPEDIC SURGERY | Age: 60
End: 2020-11-13

## 2020-11-13 PROCEDURE — 99024 POSTOP FOLLOW-UP VISIT: CPT | Performed by: ORTHOPAEDIC SURGERY

## 2020-11-13 NOTE — PROGRESS NOTES
I did have a telephone conversation with the patient today in regards to her left knee postoperative follow-up from left total knee arthroplasty. She is doing very well with the knee at this time she states. She denies any significant pain within the knee. She says the surgical incision is well-healed. She is doing physical therapy at [facility] since he states her last measurements were extension to 0 degrees and flexion up to 135 degrees. She does state that she is getting around very well. Does have a little bit of difficulty doing stairs with her legs individually but overall feels like her strength is improving. She is no longer using any assistive ambulatory devices. Plan for her at this time is to go down to once a week with physical therapy as she is somewhat concerned about going to therapy due to Covid. She is going to follow-up with me at 1 year postoperatively for the joint for repeat x-rays. I have no restrictions on her level of activity and I did encourage her to continue on with low impact exercise on a regular basis to keep this leg strong.   We will see her back earlier if she has any further problems

## (undated) DEVICE — 35 ML SYRINGE LUER-LOCK TIP: Brand: MONOJECT

## (undated) DEVICE — SUTURE MCRYL SZ 4-0 L27IN ABSRB UD L19MM PS-2 1/2 CIR PRIM Y426H

## (undated) DEVICE — NON RIMMED SPEED PIN 65MM STERILE
Type: IMPLANTABLE DEVICE | Status: NON-FUNCTIONAL
Removed: 2020-10-01

## (undated) DEVICE — COMPONENT KNEE LOWER EXTREMITIES. ANCIL ZIRCONIUM NAVIO DISP

## (undated) DEVICE — 3M™ IOBAN™ 2 ANTIMICROBIAL INCISE DRAPE 6650EZ: Brand: IOBAN™ 2

## (undated) DEVICE — RECIPROCATING BLADE, DOUBLE SIDED, OFFSET  (70.0 X 0.64 X 12.6MM)

## (undated) DEVICE — Device

## (undated) DEVICE — T4 HOOD

## (undated) DEVICE — Z INACTIVE USE 2660664 SOLUTION IRRIG 3000ML 0.9% SOD CHL USP UROMATIC PLAS CONT

## (undated) DEVICE — DRAPE C ARM UNIV W41XL74IN CLR PLAS XR VELC CLSR POLY STRP

## (undated) DEVICE — BASIC SINGLE BASIN 1-LF: Brand: MEDLINE INDUSTRIES, INC.

## (undated) DEVICE — SUTURE ETHBND EXCEL SZ 0 L30IN NONABSORBABLE GRN CT1 L36MM X424H

## (undated) DEVICE — HYPODERMIC SAFETY NEEDLE: Brand: MAGELLAN

## (undated) DEVICE — PENCIL ES ULT VAC W TELSCP NOSE EZ CLN BLDE 10FT

## (undated) DEVICE — 450 ML BOTTLE OF 0.05% CHLORHEXIDINE GLUCONATE IN 99.95% STERILE WATER FOR IRRIGATION, USP AND APPLICATOR.: Brand: IRRISEPT ANTIMICROBIAL WOUND LAVAGE

## (undated) DEVICE — PAD N ADH W3XL4IN POLY COT SFT PERF FLM EASILY CUT ABSRB

## (undated) DEVICE — DRESSING W4XL8IN ISLANDTHERABOND 3D

## (undated) DEVICE — STERILE TOTAL KNEE DRAPE PACK: Brand: CARDINAL HEALTH

## (undated) DEVICE — KNEE HOLDER DISPOSABLE LINER: Brand: ALVARADO®  KNEE SUPPORT

## (undated) DEVICE — HANDPIECE SET WITH HIGH FLOW TIP AND SUCTION TUBE: Brand: INTERPULSE

## (undated) DEVICE — Z DISCONTINUED USE 2275686 GLOVE SURG SZ 8 L12IN FNGR THK13MIL WHT ISOLEX POLYISOPRENE

## (undated) DEVICE — GLOVE ORANGE PI 8   MSG9080

## (undated) DEVICE — DRESSING CNTCT 4X12IN IS THERABOND 3D

## (undated) DEVICE — RIMMED SPEED PIN 45MM STERILE

## (undated) DEVICE — SYSTEM SKIN CLSR 22CM DERMBND PRINEO

## (undated) DEVICE — NAVIO FLAT MARKERS: Brand: NAVIO

## (undated) DEVICE — MASC TURNOVER KIT: Brand: MEDLINE INDUSTRIES, INC.

## (undated) DEVICE — 3 BONE CEMENT MIXER: Brand: MIXEVAC

## (undated) DEVICE — DECANTER FLD 9IN ST BG FOR ASEP TRNSF OF FLD

## (undated) DEVICE — SUTURE VCRL SZ 2-0 L36IN ABSRB UD L36MM CT-1 1/2 CIR J945H

## (undated) DEVICE — SUTURE STRATAFIX SZ 1 L14IN ABSRB VLT L36MM MO-4 TAPERPOINT SXPD2B400

## (undated) DEVICE — SUTURE VCRL SZ 0 L36IN ABSRB UD L36MM CT-1 1/2 CIR J946H

## (undated) DEVICE — 2108 SERIES SAGITTAL BLADE (19.5 X 1.27 X 110.0MM)

## (undated) DEVICE — FAIRFIELD KNEE LF

## (undated) DEVICE — ZIMMER® STERILE DISPOSABLE TOURNIQUET CUFF WITH PLC, DUAL PORT, SINGLE BLADDER, 30 IN. (76 CM)

## (undated) DEVICE — 3M™ TEGADERM™ TRANSPARENT FILM DRESSING FRAME STYLE, 1626W, 4 IN X 4-3/4 IN (10 CM X 12 CM), 50/CT 4CT/CASE: Brand: 3M™ TEGADERM™

## (undated) DEVICE — APPLICATOR PREP 26ML 0.7% IOD POVACRYLEX 74% ISO ALC ST

## (undated) DEVICE — CONTAINER,SPECIMEN,OR STERILE,4OZ: Brand: MEDLINE

## (undated) DEVICE — ADHESIVE SKIN CLSR 0.7ML TOP DERMBND ADV

## (undated) DEVICE — TOTAL BASIC PK